# Patient Record
Sex: MALE | Race: WHITE | URBAN - METROPOLITAN AREA
[De-identification: names, ages, dates, MRNs, and addresses within clinical notes are randomized per-mention and may not be internally consistent; named-entity substitution may affect disease eponyms.]

---

## 2017-04-21 ENCOUNTER — OFFICE VISIT (OUTPATIENT)
Dept: SLEEP MEDICINE | Age: 37
End: 2017-04-21

## 2017-04-21 VITALS
HEART RATE: 80 BPM | BODY MASS INDEX: 34.67 KG/M2 | HEIGHT: 72 IN | DIASTOLIC BLOOD PRESSURE: 76 MMHG | WEIGHT: 256 LBS | OXYGEN SATURATION: 98 % | SYSTOLIC BLOOD PRESSURE: 110 MMHG

## 2017-04-21 DIAGNOSIS — G47.33 OBSTRUCTIVE SLEEP APNEA SYNDROME: Primary | Chronic | ICD-10-CM

## 2017-04-21 DIAGNOSIS — E66.9 NON MORBID OBESITY, UNSPECIFIED OBESITY TYPE: Chronic | ICD-10-CM

## 2017-04-21 PROCEDURE — 99212 OFFICE O/P EST SF 10 MIN: CPT | Performed by: NURSE PRACTITIONER

## 2017-04-21 ASSESSMENT — ENCOUNTER SYMPTOMS
ABDOMINAL DISTENTION: 0
APNEA: 0
SINUS PRESSURE: 0
COUGH: 0
SHORTNESS OF BREATH: 0
ABDOMINAL PAIN: 0
RHINORRHEA: 0

## 2017-04-21 ASSESSMENT — SLEEP AND FATIGUE QUESTIONNAIRES
HOW LIKELY ARE YOU TO NOD OFF OR FALL ASLEEP WHILE SITTING AND TALKING TO SOMEONE: 0
HOW LIKELY ARE YOU TO NOD OFF OR FALL ASLEEP WHILE SITTING AND READING: 0
HOW LIKELY ARE YOU TO NOD OFF OR FALL ASLEEP WHILE LYING DOWN TO REST IN THE AFTERNOON WHEN CIRCUMSTANCES PERMIT: 0
HOW LIKELY ARE YOU TO NOD OFF OR FALL ASLEEP IN A CAR, WHILE STOPPED FOR A FEW MINUTES IN TRAFFIC: 0
HOW LIKELY ARE YOU TO NOD OFF OR FALL ASLEEP WHILE SITTING INACTIVE IN A PUBLIC PLACE: 0
ESS TOTAL SCORE: 1
HOW LIKELY ARE YOU TO NOD OFF OR FALL ASLEEP WHEN YOU ARE A PASSENGER IN A CAR FOR AN HOUR WITHOUT A BREAK: 0
HOW LIKELY ARE YOU TO NOD OFF OR FALL ASLEEP WHILE SITTING QUIETLY AFTER LUNCH WITHOUT ALCOHOL: 0
HOW LIKELY ARE YOU TO NOD OFF OR FALL ASLEEP WHILE WATCHING TV: 1

## 2018-03-20 ENCOUNTER — TELEPHONE (OUTPATIENT)
Dept: SLEEP MEDICINE | Age: 38
End: 2018-03-20

## 2018-03-20 NOTE — TELEPHONE ENCOUNTER
Pt called. He has continued to lose weight and stopped using his unit. Explained to pt that weight loss decreases sleep apnea however weight loss does not always mean there is no sleep apnea. Pt has a f/u appointment in April and will use unit until appointment to gather data on sleep. Pt states that he is no longer snoring.

## 2018-04-20 ENCOUNTER — OFFICE VISIT (OUTPATIENT)
Dept: SLEEP MEDICINE | Age: 38
End: 2018-04-20

## 2018-04-20 VITALS
WEIGHT: 194 LBS | OXYGEN SATURATION: 97 % | DIASTOLIC BLOOD PRESSURE: 74 MMHG | SYSTOLIC BLOOD PRESSURE: 128 MMHG | HEART RATE: 74 BPM | BODY MASS INDEX: 26.28 KG/M2 | HEIGHT: 72 IN

## 2018-04-20 DIAGNOSIS — G47.33 OBSTRUCTIVE SLEEP APNEA SYNDROME: Primary | Chronic | ICD-10-CM

## 2018-04-20 PROCEDURE — 99212 OFFICE O/P EST SF 10 MIN: CPT | Performed by: NURSE PRACTITIONER

## 2018-04-20 ASSESSMENT — SLEEP AND FATIGUE QUESTIONNAIRES
HOW LIKELY ARE YOU TO NOD OFF OR FALL ASLEEP IN A CAR, WHILE STOPPED FOR A FEW MINUTES IN TRAFFIC: 0
HOW LIKELY ARE YOU TO NOD OFF OR FALL ASLEEP WHILE SITTING AND TALKING TO SOMEONE: 0
HOW LIKELY ARE YOU TO NOD OFF OR FALL ASLEEP WHILE SITTING INACTIVE IN A PUBLIC PLACE: 0
ESS TOTAL SCORE: 1
HOW LIKELY ARE YOU TO NOD OFF OR FALL ASLEEP WHILE SITTING AND READING: 1
HOW LIKELY ARE YOU TO NOD OFF OR FALL ASLEEP WHILE WATCHING TV: 0
HOW LIKELY ARE YOU TO NOD OFF OR FALL ASLEEP WHILE SITTING QUIETLY AFTER LUNCH WITHOUT ALCOHOL: 0
HOW LIKELY ARE YOU TO NOD OFF OR FALL ASLEEP WHEN YOU ARE A PASSENGER IN A CAR FOR AN HOUR WITHOUT A BREAK: 0
HOW LIKELY ARE YOU TO NOD OFF OR FALL ASLEEP WHILE LYING DOWN TO REST IN THE AFTERNOON WHEN CIRCUMSTANCES PERMIT: 0

## 2018-04-20 ASSESSMENT — ENCOUNTER SYMPTOMS
RHINORRHEA: 0
ABDOMINAL PAIN: 0
ABDOMINAL DISTENTION: 0
COUGH: 0
APNEA: 0
SINUS PRESSURE: 0
SHORTNESS OF BREATH: 0

## 2018-08-30 ENCOUNTER — OFFICE VISIT (OUTPATIENT)
Dept: ORTHOPEDIC SURGERY | Age: 38
End: 2018-08-30

## 2018-08-30 VITALS
WEIGHT: 185 LBS | BODY MASS INDEX: 25.06 KG/M2 | SYSTOLIC BLOOD PRESSURE: 119 MMHG | DIASTOLIC BLOOD PRESSURE: 79 MMHG | HEIGHT: 72 IN | HEART RATE: 80 BPM

## 2018-08-30 DIAGNOSIS — S39.012A LUMBAR STRAIN, INITIAL ENCOUNTER: Primary | ICD-10-CM

## 2018-08-30 PROCEDURE — 99203 OFFICE O/P NEW LOW 30 MIN: CPT | Performed by: PHYSICIAN ASSISTANT

## 2018-08-30 RX ORDER — METHOCARBAMOL 750 MG/1
750 TABLET, FILM COATED ORAL 3 TIMES DAILY
Qty: 45 TABLET | Refills: 0 | Status: SHIPPED | OUTPATIENT
Start: 2018-08-30 | End: 2018-09-14

## 2018-08-30 NOTE — PROGRESS NOTES
Subjective:      Patient ID: Nelda Gibson is a 41 y.o.  male. Chief Complaint   Patient presents with    Back Problem     Patient states about 2 weeks after running with his child he started to have LBP        HPI: He is here in the [address] [place] [address]  for an initial evaluation of Left sided low back pain. This pain started approximately 2 weeks ago after picking up his 1year-old, 30 pound child. Since that time, he said discomfort in the left side of his lower back. He denies any fevers or chills. He denies any radicular pain, numbness or tingling. Denies any bladder or bowel changes. Pain has changed since onset. Mildly improved  Pain is associated with:  Numbness: No.   Tingling: No.   Weakness: No.   Difficulty controlling bowels: No.  Difficulty controlling bladder: No.  Electrical shock sensation in her legs: No.  Difficulty with balance while standing or walking: No.    The following changes pain for the better: Change in position. The following changes pain for the worse: Sitting or standing in one position for extended period of time. Bending forward. The following treatment has been tried:  Physical therapy: No.   Chiropractic treatment: No.  Epidural steroid injection/block: No.   Prescription medications: No.   Over-the-counter medications: Yes. Ibuprofen with possibly some relief. Review of Systems:  Pertinent items are noted in HPI. A 14 point review of systems have been reviewed from Patient History Form dated on 8/30/18 and available in the patient's chart under the media tab. Past Medical History:   Diagnosis Date    Obstructive sleep apnea        Family History   Problem Relation Age of Onset    Heart Failure Father        Past Surgical History:   Procedure Laterality Date    WISDOM TOOTH EXTRACTION         Social History     Occupational History    Not on file.      Social History Main Topics    Smoking status: Former Smoker     Packs/day: 0.50     Years: 9.00     Quit date: 4/20/2009    Smokeless tobacco: Never Used    Alcohol use 0.5 oz/week     1 Standard drinks or equivalent per week      Comment: occassional    Drug use: No    Sexual activity: Not on file       Current Outpatient Prescriptions   Medication Sig Dispense Refill    methocarbamol (ROBAXIN-750) 750 MG tablet Take 1 tablet by mouth 3 times daily for 15 days 45 tablet 0    ibuprofen (ADVIL;MOTRIN) 400 MG tablet Take 400 mg by mouth every 6 hours as needed.  butalbital-acetaminophen-caffeine (FIORICET) per tablet Take 1 tablet by mouth every 4 hours as needed for Pain for 20 doses. 20 tablet 0     No current facility-administered medications for this visit. Objective:     He is alert, oriented x 3, pleasant, well nourished, developed and in no acute distress. /79   Pulse 80   Ht 6' (1.829 m)   Wt 185 lb (83.9 kg)   BMI 25.09 kg/m²        Lumbar Spine Exam:  Deformity: Absent . Soft Tissue Swelling: Absent . Soft Tissue Tenderness: Present. Midline Bone Tenderness:Absent . Paraspinal Muscular Spasm: Present. Previous Incisions: Absent . Erythema Absent . Lumbar Flexion does produce pain. Lumbar Extension does not produce pain.       Range of Motion: Lumbar spine in degrees           Flexion        Extension       Right Bend        Left Bend                                                                     Motor Exam:                                Normal=N   Weak=W   Unable=U   Toe Walk Heel Walk Single Leg Squat   Right            N           N               N   Left            N           N               N     Strength Scale: 1-5   Extensor Hallucis Longus L5 Anterior Tibialis   L4 Quadriceps   L2,3,4    Right             5            5         5   Left             5            5         5     Reflex Exam: 0-4+   Achilles Tendon (gastroc)   S1 Patellar Tendon (quads)   L4   Right                 2+              2+   Left                 2+ 2+     Sensory Exam:    Special Testing: N= Negative  P= Positive   Straight leg raise Reverse straight leg raise Contralateral straight leg raise Log roll Arsh test (kalyan) Babinski Giordano's  test   Right     N     N     N     N     N      N   Left     N     N     N     N     N      N       Gait normal Heel/ Toe. Left hip exam:  There is no deformity. There is no pain with internal and external rotation. There is no pain with flexion and extension. ROM- full range of motion. Trochanteric region is not tender to palpation. There is no pain with weight bearing. Right hip exam:  There is no deformity. There is no pain with internal and external rotation. There is no pain with flexion and extension. ROM- full range of motion. Trochanteric region is not tender to palpation. There is no pain with weight bearing. Vascular exam:  Examination of bilateral lower extremities:   Pallor or Rubor: absent. Digits are warm to touch, capillary refill is less than 2 seconds. There is No edema noted. Skin Exam:  Examination of the skin over both lower extremities reveals: The skin to be intact without lacerations or abrasions. No significant erythema. No rashes or skin lesions. X Rays: performed in the office today:   AP and Lateral Lumbar Spine Radiographs:   Normal findings. The alignment of the lumbar spine is normal. There is no significant degenerative disc and no facet arthropathy. Normal lordotic curve is noted. Additional tests reviewed:  None      Diagnosis:        ICD-10-CM ICD-9-CM    1. Lumbar strain, initial encounter S39.012A 847.2 XR LUMBAR SPINE (2-3 VIEWS)      methocarbamol (ROBAXIN-750) 750 MG tablet      Ambulatory referral to Physical Therapy        Assessment/ Plan:      I believe he has sustained a mild lumbar strain/sprain. I do not believe he has a disc herniation, infection or significant stenosis.       The natural history of the patient's diagnosis as well as the treatment options were discussed in full and questions were answered. Risks and benefits of the treatment options also reviewed in detail. Proper lifting/bending technique discussed  Stretching exercises discussed with patient  OTC NSAID recommended- continue with the ibuprofen 600 mg 3 times daily with GI precautions  Muscle relaxant prescribed- Robaxin 750 mg, 1 q. 68 hours p.r.n. spasm, patient warned about possible sedation  Physical therapy referral provided        Follow Up: 2 weeks with Dr Aster Juan. Call or return to clinic prn if these symptoms worsen or fail to improve as anticipated.

## 2018-08-30 NOTE — LETTER
[facility]  [address] [facility]  [address] [place] [address],[address]  [address]  Phone: [phone]  Fax: [phone]    [place], [place]        September 21, 2018     Jian Bowie MD  [facility]  [facility] [id number]    Patient: Bradley Ayoub  MR Number: [id number]  YOB: 1980  Date of Visit: 8/30/2018    Dear Dr. Jian Bowie:    Thank you for the request for consultation for Bradley Ayoub to me for the evaluation of   Encounter Diagnosis   Name Primary?  Lumbar strain, initial encounter Yes     . Below are the relevant portions of my assessment and plan of care. Subjective:      Patient ID: Bradley Ayoub is a 41 y.o.  male. Chief Complaint   Patient presents with    Back Problem     Patient states about 2 weeks after running with his child he started to have LBP        HPI: He is here in the [address] [place] [address]  for an initial evaluation of Left sided low back pain. This pain started approximately 2 weeks ago after picking up his 1year-old, 30 pound child. Since that time, he said discomfort in the left side of his lower back. He denies any fevers or chills. He denies any radicular pain, numbness or tingling. Denies any bladder or bowel changes. Pain has changed since onset. Mildly improved  Pain is associated with:  Numbness: No.   Tingling: No.   Weakness: No.   Difficulty controlling bowels: No.  Difficulty controlling bladder: No.  Electrical shock sensation in her legs: No.  Difficulty with balance while standing or walking: No.    The following changes pain for the better: Change in position. The following changes pain for the worse: Sitting or standing in one position for extended period of time. Bending forward.     The following treatment has been tried:  Physical therapy: No.   Chiropractic treatment: No.  Epidural steroid injection/block: No.   Prescription medications: No. Flexion        Extension       Right Bend        Left Bend                                                                     Motor Exam:                                Normal=N   Weak=W   Unable=U   Toe Walk Heel Walk Single Leg Squat   Right            N           N               N   Left            N           N               N     Strength Scale: 1-5   Extensor Hallucis Longus L5 Anterior Tibialis   L4 Quadriceps   L2,3,4    Right             5            5         5   Left             5            5         5     Reflex Exam: 0-4+   Achilles Tendon (gastroc)   S1 Patellar Tendon (quads)   L4   Right                 2+              2+   Left                 2+              2+     Sensory Exam:    Special Testing: N= Negative  P= Positive   Straight leg raise Reverse straight leg raise Contralateral straight leg raise Log roll Arsh test (kalyan) Babinski Giordano's  test   Right     N     N     N     N     N      N   Left     N     N     N     N     N      N       Gait normal Heel/ Toe. Left hip exam:  There is no deformity. There is no pain with internal and external rotation. There is no pain with flexion and extension. ROM- full range of motion. Trochanteric region is not tender to palpation. There is no pain with weight bearing. Right hip exam:  There is no deformity. There is no pain with internal and external rotation. There is no pain with flexion and extension. ROM- full range of motion. Trochanteric region is not tender to palpation. There is no pain with weight bearing. Vascular exam:  Examination of bilateral lower extremities:   Pallor or Rubor: absent. Digits are warm to touch, capillary refill is less than 2 seconds. There is No edema noted. Skin Exam:  Examination of the skin over both lower extremities reveals: The skin to be intact without lacerations or abrasions. No significant erythema. No rashes or skin lesions. X Rays: performed in the office today:   AP and Lateral Lumbar Spine Radiographs:   Normal findings. The alignment of the lumbar spine is normal. There is no significant degenerative disc and no facet arthropathy. Normal lordotic curve is noted. Additional tests reviewed:  None      Diagnosis:        ICD-10-CM ICD-9-CM    1. Lumbar strain, initial encounter S39.012A 847.2 XR LUMBAR SPINE (2-3 VIEWS)      methocarbamol (ROBAXIN-750) 750 MG tablet      Ambulatory referral to Physical Therapy        Assessment/ Plan:      I believe he has sustained a mild lumbar strain/sprain. I do not believe he has a disc herniation, infection or significant stenosis. The natural history of the patient's diagnosis as well as the treatment options were discussed in full and questions were answered. Risks and benefits of the treatment options also reviewed in detail. Proper lifting/bending technique discussed  Stretching exercises discussed with patient  OTC NSAID recommended- continue with the ibuprofen 600 mg 3 times daily with GI precautions  Muscle relaxant prescribed- Robaxin 750 mg, 1 q. 68 hours p.r.n. spasm, patient warned about possible sedation  Physical therapy referral provided        Follow Up: 2 weeks with Dr Reza Babb. Call or return to clinic prn if these symptoms worsen or fail to improve as anticipated. If you have questions, please do not hesitate to call me. I look forward to following Sly Nguyễn along with you.     Sincerely,        MARY Lazar

## 2018-09-21 NOTE — COMMUNICATION BODY
Subjective:      Patient ID: Angela Ibarra is a 41 y.o.  male. Chief Complaint   Patient presents with    Back Problem     Patient states about 2 weeks after running with his child he started to have LBP        HPI: He is here in the [address] [place] [address]  for an initial evaluation of Left sided low back pain. This pain started approximately 2 weeks ago after picking up his 1year-old, 30 pound child. Since that time, he said discomfort in the left side of his lower back. He denies any fevers or chills. He denies any radicular pain, numbness or tingling. Denies any bladder or bowel changes. Pain has changed since onset. Mildly improved  Pain is associated with:  Numbness: No.   Tingling: No.   Weakness: No.   Difficulty controlling bowels: No.  Difficulty controlling bladder: No.  Electrical shock sensation in her legs: No.  Difficulty with balance while standing or walking: No.    The following changes pain for the better: Change in position. The following changes pain for the worse: Sitting or standing in one position for extended period of time. Bending forward. The following treatment has been tried:  Physical therapy: No.   Chiropractic treatment: No.  Epidural steroid injection/block: No.   Prescription medications: No.   Over-the-counter medications: Yes. Ibuprofen with possibly some relief. Review of Systems:  Pertinent items are noted in HPI. A 14 point review of systems have been reviewed from Patient History Form dated on 8/30/18 and available in the patient's chart under the media tab. Past Medical History:   Diagnosis Date    Obstructive sleep apnea        Family History   Problem Relation Age of Onset    Heart Failure Father        Past Surgical History:   Procedure Laterality Date    WISDOM TOOTH EXTRACTION         Social History     Occupational History    Not on file.      Social History Main Topics    Smoking status: Former Smoker     Packs/day: 0.50     Years: 9.00     Quit date: 4/20/2009    Smokeless tobacco: Never Used    Alcohol use 0.5 oz/week     1 Standard drinks or equivalent per week      Comment: occassional    Drug use: No    Sexual activity: Not on file       Current Outpatient Prescriptions   Medication Sig Dispense Refill    methocarbamol (ROBAXIN-750) 750 MG tablet Take 1 tablet by mouth 3 times daily for 15 days 45 tablet 0    ibuprofen (ADVIL;MOTRIN) 400 MG tablet Take 400 mg by mouth every 6 hours as needed.  butalbital-acetaminophen-caffeine (FIORICET) per tablet Take 1 tablet by mouth every 4 hours as needed for Pain for 20 doses. 20 tablet 0     No current facility-administered medications for this visit. Objective:     He is alert, oriented x 3, pleasant, well nourished, developed and in no acute distress. /79   Pulse 80   Ht 6' (1.829 m)   Wt 185 lb (83.9 kg)   BMI 25.09 kg/m²        Lumbar Spine Exam:  Deformity: Absent . Soft Tissue Swelling: Absent . Soft Tissue Tenderness: Present. Midline Bone Tenderness:Absent . Paraspinal Muscular Spasm: Present. Previous Incisions: Absent . Erythema Absent . Lumbar Flexion does produce pain. Lumbar Extension does not produce pain.       Range of Motion: Lumbar spine in degrees           Flexion        Extension       Right Bend        Left Bend                                                                     Motor Exam:                                Normal=N   Weak=W   Unable=U   Toe Walk Heel Walk Single Leg Squat   Right            N           N               N   Left            N           N               N     Strength Scale: 1-5   Extensor Hallucis Longus L5 Anterior Tibialis   L4 Quadriceps   L2,3,4    Right             5            5         5   Left             5            5         5     Reflex Exam: 0-4+   Achilles Tendon (gastroc)   S1 Patellar Tendon (quads)   L4   Right                 2+              2+   Left                 2+ 2+     Sensory Exam:    Special Testing: N= Negative  P= Positive   Straight leg raise Reverse straight leg raise Contralateral straight leg raise Log roll Arsh test (kalyan) Babinski Giordano's  test   Right     N     N     N     N     N      N   Left     N     N     N     N     N      N       Gait normal Heel/ Toe. Left hip exam:  There is no deformity. There is no pain with internal and external rotation. There is no pain with flexion and extension. ROM- full range of motion. Trochanteric region is not tender to palpation. There is no pain with weight bearing. Right hip exam:  There is no deformity. There is no pain with internal and external rotation. There is no pain with flexion and extension. ROM- full range of motion. Trochanteric region is not tender to palpation. There is no pain with weight bearing. Vascular exam:  Examination of bilateral lower extremities:   Pallor or Rubor: absent. Digits are warm to touch, capillary refill is less than 2 seconds. There is No edema noted. Skin Exam:  Examination of the skin over both lower extremities reveals: The skin to be intact without lacerations or abrasions. No significant erythema. No rashes or skin lesions. X Rays: performed in the office today:   AP and Lateral Lumbar Spine Radiographs:   Normal findings. The alignment of the lumbar spine is normal. There is no significant degenerative disc and no facet arthropathy. Normal lordotic curve is noted. Additional tests reviewed:  None      Diagnosis:        ICD-10-CM ICD-9-CM    1. Lumbar strain, initial encounter S39.012A 847.2 XR LUMBAR SPINE (2-3 VIEWS)      methocarbamol (ROBAXIN-750) 750 MG tablet      Ambulatory referral to Physical Therapy        Assessment/ Plan:      I believe he has sustained a mild lumbar strain/sprain. I do not believe he has a disc herniation, infection or significant stenosis.       The natural history of the patient's diagnosis as well as the treatment options were discussed in full and questions were answered. Risks and benefits of the treatment options also reviewed in detail. Proper lifting/bending technique discussed  Stretching exercises discussed with patient  OTC NSAID recommended- continue with the ibuprofen 600 mg 3 times daily with GI precautions  Muscle relaxant prescribed- Robaxin 750 mg, 1 q. 68 hours p.r.n. spasm, patient warned about possible sedation  Physical therapy referral provided        Follow Up: 2 weeks with Dr Charlie Reynolds. Call or return to clinic prn if these symptoms worsen or fail to improve as anticipated.

## 2019-05-03 ENCOUNTER — EMPLOYEE WELLNESS (OUTPATIENT)
Dept: OTHER | Age: 39
End: 2019-05-03

## 2019-05-03 LAB
CHOLESTEROL, TOTAL: 166 MG/DL (ref 0–199)
GLUCOSE BLD-MCNC: 74 MG/DL (ref 70–99)
HDLC SERPL-MCNC: 35 MG/DL (ref 40–60)
LDL CHOLESTEROL CALCULATED: 107 MG/DL
TRIGL SERPL-MCNC: 119 MG/DL (ref 0–150)

## 2019-05-13 VITALS — WEIGHT: 202 LBS | BODY MASS INDEX: 27.4 KG/M2

## 2019-07-08 ENCOUNTER — OFFICE VISIT (OUTPATIENT)
Dept: INTERNAL MEDICINE CLINIC | Age: 39
End: 2019-07-08
Payer: COMMERCIAL

## 2019-07-08 VITALS
HEART RATE: 63 BPM | WEIGHT: 199 LBS | BODY MASS INDEX: 27.86 KG/M2 | SYSTOLIC BLOOD PRESSURE: 110 MMHG | DIASTOLIC BLOOD PRESSURE: 64 MMHG | OXYGEN SATURATION: 98 % | HEIGHT: 71 IN

## 2019-07-08 DIAGNOSIS — G47.33 OBSTRUCTIVE SLEEP APNEA SYNDROME: Primary | ICD-10-CM

## 2019-07-08 DIAGNOSIS — Z86.39 HISTORY OF OBESITY: ICD-10-CM

## 2019-07-08 PROCEDURE — 99203 OFFICE O/P NEW LOW 30 MIN: CPT | Performed by: INTERNAL MEDICINE

## 2019-07-08 SDOH — HEALTH STABILITY: MENTAL HEALTH: HOW OFTEN DO YOU HAVE A DRINK CONTAINING ALCOHOL?: 2-4 TIMES A MONTH

## 2019-07-08 SDOH — HEALTH STABILITY: MENTAL HEALTH: HOW MANY STANDARD DRINKS CONTAINING ALCOHOL DO YOU HAVE ON A TYPICAL DAY?: 1 OR 2

## 2019-07-08 ASSESSMENT — PATIENT HEALTH QUESTIONNAIRE - PHQ9
2. FEELING DOWN, DEPRESSED OR HOPELESS: 0
SUM OF ALL RESPONSES TO PHQ9 QUESTIONS 1 & 2: 0
SUM OF ALL RESPONSES TO PHQ QUESTIONS 1-9: 0
SUM OF ALL RESPONSES TO PHQ QUESTIONS 1-9: 0
1. LITTLE INTEREST OR PLEASURE IN DOING THINGS: 0

## 2019-07-08 NOTE — PROGRESS NOTES
[address]  [facility] [address]: [phone]    Patient Name: Milagro Adams     YOB: 1980     Today's Date: 7/8/19          Chief Complaint   Patient presents with   [facility] Established New Doctor    Referral - General     sleep medicine          History of Present Illness:  Leroy James is a 42 y.o. male here to establish care. Patient here for evaluation for sleep apnea  Was diagnosed 4 to 5 years ago. At that time he weighed 270 pounds. He lost weight intentionally. He stopped consuming sugars and started exercising. He tried intermittent fasting. He is a runner. Currently training for his first marathon. He admits that he would still like to lose 10 more pounds. That would allow him to get to his minimum adult weight that he has obtained in the past.  He also thinks it may help his marathon. He admits he is feels sometimes struggles with body image. History:     Past Medical History:   Diagnosis Date    Obstructive sleep apnea 2015       Past Surgical History:   Procedure Laterality Date    WISDOM TOOTH EXTRACTION         Social History     Socioeconomic History    Marital status:      Spouse name: Not on file    Number of children: Not on file    Years of education: Not on file    Highest education level: Not on file   Occupational History    Occupation:     Social Needs    Financial resource strain: Not on file    Food insecurity:     Worry: Not on file     Inability: Not on file   Tradiio needs:     Medical: Not on file     Non-medical: Not on file   Tobacco Use    Smoking status: Former Smoker     Packs/day: 0.50     Years: 9.00     Pack years: 4.50     Last attempt to quit: 4/20/2009     Years since quitting: 10.2    Smokeless tobacco: Never Used   Substance and Sexual Activity    Alcohol use:  Yes     Alcohol/week: 2.0 standard drinks     Types: 2 Standard drinks or equivalent per

## 2019-07-17 ASSESSMENT — ENCOUNTER SYMPTOMS
RHINORRHEA: 0
SORE THROAT: 0
BACK PAIN: 0
CONSTIPATION: 0
VOMITING: 0
COUGH: 0
SINUS PRESSURE: 0
NAUSEA: 0
EYE REDNESS: 0
CHEST TIGHTNESS: 0
ABDOMINAL PAIN: 0
WHEEZING: 0
SHORTNESS OF BREATH: 0
DIARRHEA: 0

## 2019-10-02 ENCOUNTER — OFFICE VISIT (OUTPATIENT)
Dept: PULMONOLOGY | Age: 39
End: 2019-10-02
Payer: COMMERCIAL

## 2019-10-02 VITALS
BODY MASS INDEX: 27.16 KG/M2 | DIASTOLIC BLOOD PRESSURE: 70 MMHG | SYSTOLIC BLOOD PRESSURE: 110 MMHG | HEIGHT: 71 IN | HEART RATE: 90 BPM | WEIGHT: 194 LBS | OXYGEN SATURATION: 99 %

## 2019-10-02 DIAGNOSIS — G47.33 OBSTRUCTIVE SLEEP APNEA SYNDROME: Primary | ICD-10-CM

## 2019-10-02 PROCEDURE — 99212 OFFICE O/P EST SF 10 MIN: CPT | Performed by: NURSE PRACTITIONER

## 2019-10-02 ASSESSMENT — SLEEP AND FATIGUE QUESTIONNAIRES
HOW LIKELY ARE YOU TO NOD OFF OR FALL ASLEEP WHILE SITTING INACTIVE IN A PUBLIC PLACE: 0
HOW LIKELY ARE YOU TO NOD OFF OR FALL ASLEEP WHILE SITTING QUIETLY AFTER LUNCH WITHOUT ALCOHOL: 0
HOW LIKELY ARE YOU TO NOD OFF OR FALL ASLEEP WHEN YOU ARE A PASSENGER IN A CAR FOR AN HOUR WITHOUT A BREAK: 0
HOW LIKELY ARE YOU TO NOD OFF OR FALL ASLEEP WHILE SITTING AND TALKING TO SOMEONE: 0
HOW LIKELY ARE YOU TO NOD OFF OR FALL ASLEEP WHILE SITTING AND READING: 0
ESS TOTAL SCORE: 0
HOW LIKELY ARE YOU TO NOD OFF OR FALL ASLEEP WHILE LYING DOWN TO REST IN THE AFTERNOON WHEN CIRCUMSTANCES PERMIT: 0
HOW LIKELY ARE YOU TO NOD OFF OR FALL ASLEEP IN A CAR, WHILE STOPPED FOR A FEW MINUTES IN TRAFFIC: 0
HOW LIKELY ARE YOU TO NOD OFF OR FALL ASLEEP WHILE WATCHING TV: 0

## 2019-10-02 ASSESSMENT — ENCOUNTER SYMPTOMS
ABDOMINAL PAIN: 0
EYE PAIN: 0
SHORTNESS OF BREATH: 0
APNEA: 0
SINUS PRESSURE: 0
EYE REDNESS: 0
COUGH: 0
RHINORRHEA: 0
ABDOMINAL DISTENTION: 0

## 2019-10-23 ENCOUNTER — HOSPITAL ENCOUNTER (OUTPATIENT)
Age: 39
Discharge: HOME | End: 2019-10-23
Payer: COMMERCIAL

## 2019-10-23 ENCOUNTER — HOSPITAL ENCOUNTER (OUTPATIENT)
Dept: GENERAL RADIOLOGY | Age: 39
Discharge: HOME | End: 2019-10-23
Payer: COMMERCIAL

## 2019-10-23 ENCOUNTER — OFFICE VISIT (OUTPATIENT)
Dept: INTERNAL MEDICINE CLINIC | Age: 39
End: 2019-10-23
Payer: COMMERCIAL

## 2019-10-23 VITALS
HEART RATE: 88 BPM | BODY MASS INDEX: 28.17 KG/M2 | WEIGHT: 202 LBS | OXYGEN SATURATION: 97 % | DIASTOLIC BLOOD PRESSURE: 74 MMHG | SYSTOLIC BLOOD PRESSURE: 132 MMHG

## 2019-10-23 DIAGNOSIS — R05.9 COUGH IN ADULT: Primary | ICD-10-CM

## 2019-10-23 DIAGNOSIS — R05.9 COUGH IN ADULT: ICD-10-CM

## 2019-10-23 PROCEDURE — 71046 X-RAY EXAM CHEST 2 VIEWS: CPT

## 2019-10-23 PROCEDURE — 99213 OFFICE O/P EST LOW 20 MIN: CPT | Performed by: NURSE PRACTITIONER

## 2019-10-23 RX ORDER — AZITHROMYCIN 250 MG/1
250 TABLET, FILM COATED ORAL SEE ADMIN INSTRUCTIONS
Qty: 6 TABLET | Refills: 0 | Status: SHIPPED | OUTPATIENT
Start: 2019-10-23 | End: 2019-10-28

## 2019-10-23 ASSESSMENT — ENCOUNTER SYMPTOMS
EYES NEGATIVE: 1
COUGH: 1
SHORTNESS OF BREATH: 1

## 2020-03-05 ENCOUNTER — EMPLOYEE WELLNESS (OUTPATIENT)
Dept: OTHER | Age: 40
End: 2020-03-05

## 2020-03-05 LAB
CHOLESTEROL, TOTAL: 158 MG/DL (ref 0–199)
GLUCOSE BLD-MCNC: 95 MG/DL (ref 70–99)
HDLC SERPL-MCNC: 38 MG/DL (ref 40–60)
LDL CHOLESTEROL CALCULATED: 106 MG/DL
TRIGL SERPL-MCNC: 69 MG/DL (ref 0–150)

## 2020-03-08 LAB
3-OH-COTININE: <2 NG/ML
COTININE: <2 NG/ML
NICOTINE: <2 NG/ML

## 2020-10-19 VITALS — WEIGHT: 201 LBS | BODY MASS INDEX: 28.03 KG/M2

## 2021-04-19 ENCOUNTER — OFFICE VISIT (OUTPATIENT)
Dept: ORTHOPEDIC SURGERY | Age: 41
End: 2021-04-19
Payer: COMMERCIAL

## 2021-04-19 VITALS — WEIGHT: 215 LBS | HEIGHT: 72 IN | BODY MASS INDEX: 29.12 KG/M2

## 2021-04-19 DIAGNOSIS — M54.50 LEFT LOW BACK PAIN, UNSPECIFIED CHRONICITY, UNSPECIFIED WHETHER SCIATICA PRESENT: Primary | ICD-10-CM

## 2021-04-19 PROCEDURE — 99214 OFFICE O/P EST MOD 30 MIN: CPT | Performed by: PHYSICIAN ASSISTANT

## 2021-04-19 RX ORDER — METHOCARBAMOL 500 MG/1
500 TABLET, FILM COATED ORAL 4 TIMES DAILY
Qty: 40 TABLET | Refills: 0 | Status: SHIPPED | OUTPATIENT
Start: 2021-04-19 | End: 2021-04-29

## 2021-04-19 RX ORDER — METHYLPREDNISOLONE 4 MG/1
TABLET ORAL
Qty: 1 KIT | Refills: 0 | Status: SHIPPED | OUTPATIENT
Start: 2021-04-19 | End: 2021-07-02

## 2021-04-19 NOTE — PROGRESS NOTES
Date:  2021    Name:  Filemon Huerta  Address:  [address] [employer]    :  1980      Age:   46 y.o.    SSN:  [id number]      Medical Record Number:  [id number]    Reason for Visit:    Chief Complaint    Back Pain (LBP and Left leg pain x1 mo. No known injury.)      DOS:2021     HPI: Filemon Huerta is a 46 y.o. male here today for left low back pain and left gluteal pain that has been ongoing for a few weeks with no associated injury. Patient states he has a  and it may have been associated with picking up the . In addition he is transitioning from standing just to seated which may have contributed to this. Patient describes severe discomfort in his left lower back. Patient states that the transition from seated to standing and vice versa is extremely painful and it takes him a good amount of time to be comfortable in either. Patient is having difficulty sleeping especially if he moves throughout the night. Denies any numbness or tingling. Denies any incontinence or saddle anesthesia. Pain Assessment  Location of Pain: Back  Location Modifiers: Left  Severity of Pain: 5  Quality of Pain: Other (Comment)(shooting)  Duration of Pain: Persistent  Frequency of Pain: Constant  Date Pain First Started: (x1 mo)  Aggravating Factors: Other (Comment), Walking, Stairs(Twisting)  Limiting Behavior: Yes  Relieving Factors: Other (Comment)(Certain positions)  Result of Injury: No  Work-Related Injury: No  Are there other pain locations you wish to document?: No  ROS: Review of systems reviewed from Patient History Form completed today and available in the patient's chart under the Media tab.        Past Medical History:   Diagnosis Date    Obstructive sleep apnea         Past Surgical History:   Procedure Laterality Date    WISDOM TOOTH EXTRACTION         Family History   Problem Relation Age of Onset    COPD Father     Heart Attack Father 37    ADHD Father     No Known Problems Mother     No Known Problems Maternal Grandmother     No Known Problems Maternal Grandfather     No Known Problems Paternal Grandmother     Cancer Paternal Grandfather     Seizures Sister     No Known Problems Brother     No Known Problems Brother        Social History     Socioeconomic History    Marital status:      Spouse name: None    Number of children: None    Years of education: None    Highest education level: None   Occupational History    Occupation:     Social Needs    Financial resource strain: None    Food insecurity     Worry: None     Inability: None    Transportation needs     Medical: None     Non-medical: None   Tobacco Use    Smoking status: Former Smoker     Packs/day: 0.50     Years: 9.00     Pack years: 4.50     Quit date: 2009     Years since quittin.0    Smokeless tobacco: Never Used   Substance and Sexual Activity    Alcohol use:  Yes     Alcohol/week: 2.0 standard drinks     Types: 2 Standard drinks or equivalent per week     Frequency: 2-4 times a month     Drinks per session: 1 or 2     Binge frequency: Never     Comment: occassional    Drug use: No    Sexual activity: Yes     Partners: Female   Lifestyle    Physical activity     Days per week: None     Minutes per session: None    Stress: None   Relationships    Social connections     Talks on phone: None     Gets together: None     Attends Anabaptism service: None     Active member of club or organization: None     Attends meetings of clubs or organizations: None     Relationship status: None    Intimate partner violence     Fear of current or ex partner: None     Emotionally abused: None     Physically abused: None     Forced sexual activity: None   Other Topics Concern    None   Social History Narrative    Lives with wife and two sons- 2019        Current Outpatient Medications   Medication Sig Dispense Refill    methylPREDNISolone (MEDROL, AGUSTÍN,) 4 MG tablet Take by mouth. 1 kit 0    methocarbamol (ROBAXIN) 500 MG tablet Take 1 tablet by mouth 4 times daily for 10 days 40 tablet 0    ibuprofen (ADVIL;MOTRIN) 400 MG tablet Take 400 mg by mouth every 6 hours as needed.  butalbital-acetaminophen-caffeine (FIORICET) per tablet Take 1 tablet by mouth every 4 hours as needed for Pain for 20 doses. (Patient not taking: Reported on 4/19/2021) 20 tablet 0     No current facility-administered medications for this visit. No Known Allergies    Vital signs:  Ht 6' (1.829 m)   Wt 215 lb (97.5 kg)   BMI 29.16 kg/m²      Lumbral/sacral examination:    Gait & station: normal, patient ambulates without assistance    Inspection: Local inspection shows no step-off or bruising. Lumbar alignment is normal.    Skin: There are no rashes, ulcerations or lesions. Palpation: Mild paraspinous tenderness present bilaterally in lower lumbar region. There is paraspinal spasm. Tenderness over left gluteal muscles. Range of Motion: Limited due to pain. Both in extension and flexion. Strength: Strength testing is 5/5 in all muscle groups tested. Reflexes: Reflexes are symmetrically 2+ at the patellar and ankle tendons. Clonus absent bilaterally at the feet. Special Tests: Straight leg raise and crossed SLR negative. Leg length and pelvis level. Additional Examinations: Negative Trendelenburg test.        Diagnostics:  Radiology:       Pertinent imaging was obtained, interpreted, and reviewed with the patient today, report only - no images available    Lumbar x-ray:    AP and Lateral views were obtained and reviewed of the lumbar spine. Impression: Normal alignment, no evidence of degenerative disc or facet arthropathy.   Lordotic curve is slightly decreased    Office Procedures:  Orders Placed This Encounter   Procedures    XR LUMBAR SPINE (2-3 VIEWS)     Standing Status:   Future     Number of Occurrences:   1     Standing Expiration Date:   5/19/2021

## 2021-04-26 ENCOUNTER — OFFICE VISIT (OUTPATIENT)
Dept: ORTHOPEDIC SURGERY | Age: 41
End: 2021-04-26
Payer: COMMERCIAL

## 2021-04-26 VITALS
HEART RATE: 85 BPM | BODY MASS INDEX: 28.71 KG/M2 | DIASTOLIC BLOOD PRESSURE: 77 MMHG | HEIGHT: 72 IN | SYSTOLIC BLOOD PRESSURE: 127 MMHG | WEIGHT: 212 LBS

## 2021-04-26 DIAGNOSIS — M51.26 HNP (HERNIATED NUCLEUS PULPOSUS), LUMBAR: Primary | ICD-10-CM

## 2021-04-26 PROCEDURE — 99214 OFFICE O/P EST MOD 30 MIN: CPT | Performed by: PHYSICIAN ASSISTANT

## 2021-04-26 RX ORDER — TIZANIDINE 2 MG/1
2 TABLET ORAL 4 TIMES DAILY PRN
Qty: 60 TABLET | Refills: 0 | Status: SHIPPED | OUTPATIENT
Start: 2021-04-26 | End: 2021-05-26

## 2021-04-26 RX ORDER — GABAPENTIN 100 MG/1
100 CAPSULE ORAL 3 TIMES DAILY
Qty: 90 CAPSULE | Refills: 0 | Status: SHIPPED | OUTPATIENT
Start: 2021-04-26 | End: 2021-07-02

## 2021-04-26 NOTE — PROGRESS NOTES
Subjective:      Patient ID: Odin Winchester is a 46 y.o.  male. Chief Complaint   Patient presents with    Lower Back Pain     Pain for 6 weeks No injury        HPI:   He is here for an initial evaluation of left-sided low back pain and left posterior buttock/thigh pain. The symptoms began approximately 6 weeks ago. No history of injury. Pain has changed since onset. Symptoms are more severe. Pain is associated with:  Numbness: No.  Tingling: No.   Perceived weakness: No.   Difficulty controlling bowels: No.  Difficulty controlling bladder: No.  Electrical shock sensation in her legs: No.  Difficulty with balance while standing or walking: No.    The following changes pain for the better: Sitting, standing, rising from seated position, leaning forward and walking. The following changes pain for the worse: Prolonged sitting, prolonged standing or walking. The following treatment has been tried:  Physical therapy: No.  Chiropractic treatment: No.  Epidural steroid injection/block: No.   Prescription medications: Medrol Dosepak prescribed 4/19/2021 as well as Robaxin 500mg when he was evaluated in the after-hours clinic at [facility]. .   Over-the-counter medications: Ibuprofen. His medications are not helping. .       Review of Systems:  Pertinent items are noted in HPI. A 14 point review of systems have been reviewed from Patient History Form on 4/19/2021 as well as the Spine Form dated on today's date and available in the patient's chart under the media tab.     Past Medical History:   Diagnosis Date    Obstructive sleep apnea 2015       Family History   Problem Relation Age of Onset    COPD Father     Heart Attack Father 37    ADHD Father     No Known Problems Mother     No Known Problems Maternal Grandmother     No Known Problems Maternal Grandfather     No Known Problems Paternal Grandmother     Cancer Paternal Grandfather     Seizures Sister     No Known Problems Brother     No Known Problems Brother        Past Surgical History:   Procedure Laterality Date    WISDOM TOOTH EXTRACTION         Social History     Occupational History    Occupation:     Tobacco Use    Smoking status: Former Smoker     Packs/day: 0.50     Years: 9.00     Pack years: 4.50     Quit date: 2009     Years since quittin.0    Smokeless tobacco: Never Used   Substance and Sexual Activity    Alcohol use: Yes     Alcohol/week: 2.0 standard drinks     Types: 2 Standard drinks or equivalent per week     Frequency: 2-4 times a month     Drinks per session: 1 or 2     Binge frequency: Never     Comment: occassional    Drug use: No    Sexual activity: Yes     Partners: Female       Current Outpatient Medications   Medication Sig Dispense Refill    tiZANidine (ZANAFLEX) 2 MG tablet Take 1 tablet by mouth 4 times daily as needed (spasm) 60 tablet 0    gabapentin (NEURONTIN) 100 MG capsule Take 1 capsule by mouth 3 times daily for 30 days. Intended supply: 30 days 90 capsule 0    methylPREDNISolone (MEDROL, AGUSTÍN,) 4 MG tablet Take by mouth. 1 kit 0    methocarbamol (ROBAXIN) 500 MG tablet Take 1 tablet by mouth 4 times daily for 10 days 40 tablet 0    ibuprofen (ADVIL;MOTRIN) 400 MG tablet Take 400 mg by mouth every 6 hours as needed.  butalbital-acetaminophen-caffeine (FIORICET) per tablet Take 1 tablet by mouth every 4 hours as needed for Pain for 20 doses. 20 tablet 0     No current facility-administered medications for this visit. Objective:     He is alert, oriented x 3, pleasant, well nourished, developed and in no acute distress. /77   Pulse 85   Ht 6' (1.829 m)   Wt 212 lb (96.2 kg)   BMI 28.75 kg/m²        Lumbar Spine Exam:  Deformity: Absent . Soft Tissue Swelling: Absent . Soft Tissue Tenderness: Absent . Midline Bone Tenderness:Absent . Paraspinal Muscular Spasm: Present . Previous Incisions: Absent . Erythema Absent .   Lumbar Flexion does not produce pain.  Lumbar Extension does not produce pain. Motor Exam:                                Normal=N   Weak=W   Unable=U   Toe Walk Heel Walk Single Leg Squat   Right            N           N               N   Left            N           N               N     Strength Scale: 1-5   Extensor Hallucis Longus L5 Anterior Tibialis   L4 Quadriceps   L2,3,4    Right             5            5         5   Left             5            5         5     Reflex Exam: 0-4+   Achilles Tendon (gastroc)   S1 Patellar Tendon (quads)   L4   Right                 2+              2+   Left                 2+              2+     Sensory Exam: Intact to light touch in the left and right lower extremity. Special Testing: N= Negative  P= Positive   Straight leg raise Reverse straight leg raise Contralateral straight leg raise Log roll Arsh test (CHRIS) Babinski Giordano's  test   Right     N     N     N     N     N      N   Left     P     N     P     N     N      N       Gait normal Heel/ Toe. Left hip exam:  There is no deformity. There is no pain with internal and external rotation. There is no pain with flexion and extension. ROM- full range of motion. Trochanteric region is not tender to palpation. There is no pain with weight bearing. Right hip exam:  There is no deformity. There is no pain with internal and external rotation. There is no pain with flexion and extension. ROM- full range of motion. Trochanteric region is not tender to palpation. There is no pain with weight bearing. Vascular exam:  Examination of bilateral lower extremities:   Pallor or Rubor: absent. Digits are warm to touch, capillary refill is less than 2 seconds. There is No edema noted. Skin exam:  Examination of the skin over both lower extremities reveals: The skin to be intact without lacerations or abrasions. No significant erythema. No rashes or skin lesions.      X Rays: was not performed in the office today:

## 2021-04-28 ENCOUNTER — HOSPITAL ENCOUNTER (OUTPATIENT)
Dept: PHYSICAL THERAPY | Age: 41
Setting detail: THERAPIES SERIES
Discharge: HOME | End: 2021-04-28
Payer: COMMERCIAL

## 2021-04-28 PROCEDURE — 97110 THERAPEUTIC EXERCISES: CPT

## 2021-04-28 PROCEDURE — 97161 PT EVAL LOW COMPLEX 20 MIN: CPT

## 2021-04-28 PROCEDURE — 97530 THERAPEUTIC ACTIVITIES: CPT

## 2021-04-28 NOTE — PLAN OF CARE
[address] [address], [address] [place] [address] Drive  Phone: [phone]   Fax: [phone]     Physical Therapy Certification    Dear Referring Practitioner: MARY Butt,    We had the pleasure of evaluating the following patient for physical therapy services at [address] [place] [address]. A summary of our findings can be found in the initial assessment below. This includes our plan of care. If you have any questions or concerns regarding these findings, please do not hesitate to contact me at the office phone number checked above. Thank you for the referral.       Physician Signature:_______________________________Date:__________________  By signing above (or electronic signature), therapists plan is approved by physician      Patient: Ellen Vivar   : 1980   MRN: [id number]  Referring Physician: Referring Practitioner: MARY Butt      Evaluation Date: 2021      Medical Diagnosis Information:  Diagnosis: herniated lumbar disc                                             Insurance information: PT Insurance Information: [employer], 60/40 plan, 60 visits a year     Precautions/ Contra-indications:   Latex Allergy:  [x]NO      []YES  Preferred Language for Healthcare:   [x]English       []Other:    C-SSRS Triggered by Intake questionnaire (Past 2 wk assessment ):   [x] No, Questionnaire did not trigger screening.   [] Yes, Patient intake triggered C-SSRS Screening     [] Completed, no further action required. [] Completed, PCP notified via Epic    SUBJECTIVE: Patient stated complaint: insiduous onset of back pain that more recently progressed to being more radicular in nature. Says that he had increased his running and lifting regime and that seemed to be a potential irritation to his back pain, but he's not able to point to a particular activity that caused it.   Patient points to the lower lumbar region as area of stiffness and reports actual pain in the left buttock and some also in the right buttock to a lesser degree; no change with bowel or bladder; denies numbness or tingling; Says that the pain in his right buttock is causing him to limp. He has stopped all jarring exercise at this point including running, push ups and pull ups. Has seen orthopedic who gave him a steroid taper (he's completed that), mm relaxant, NSAIDs, and gabopentin. This all seems to be helping.     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[employer], software development, working from home currently; has a desk that can convert to a standing desk, so he does use that at times    Living Status/Prior Level of Function: Prior to this injury / incident, pt was independent with ADLs and IADLs, father of 3 young children including an infant; coaching youth baseball      OBJECTIVE:   Palpation: no significant point tenderness    Functional Mobility/Transfers: independent, but cautious due to pain    Posture: flat back posture generally        Gait: (include devices/WB status) mild antalgia, but no AD    Bandages/Dressings/Incisions: NA    Dermatomes Normal Abnormal Comments   inguinal area (L1)  x     anterior mid-thigh (L2) x     distal ant thigh/med knee (L3) x     medial lower leg and foot (L4) x     lateral lower leg and foot (L5)      posterior calf (S1) x     medial calcaneus (S2) x         Reflexes Normal Abnormal Comments   S1-2 Seated achilles x Mildly sluggish on the left   S1-2 Prone knee bend x     L3-4 Patellar tendon x     Clonus x     Babinski NT         ROM  Comments   Lumbar Flex Able to lean forward to touch the toes about 50% with pain    Lumbar Ext Limited 50% with complaints of stiffness      ROM LEFT RIGHT Comments   Lumbar Side Bend      Lumbar Rotation      Hip Flexion 100 115    Hip Abd [facility] WFL    [facility]    Hip IR      Hip Extension To 5 deg B     Knee Ext [facility] WFL    Knee Flex [facility]    Hamstring Flex 44 deg 40 deg In 90/90   Piriformis [facility]                    Joint mobility:    [x]Normal    []Hypo   []Hyper      Strength / Myotomes LEFT RIGHT Comments   Multifidus      Transverse Ab      Hip Flexors (L1-2) 4 with pain 5    Hip Abductors 4 with pain 5    Hip Extensors 4 5    Hip Internal Rotators 4 5    Hip External Rotators 4 5    Quads (L2-4) 5 5    Hamstrings  5 5    Ankle Plantarflexion (S1-2) 5 5    Ankle Dorsiflexion (L4-5) 5 5    Ankle Inversion      Ankle Eversion (S1-2)      Great Toe Extension (L5)                 Neural dynamic tension testing Normal Abnormal Comments   Slump Test  - Degree of knee flexion:   x    SLR       0-30 x     30-70  x    Femoral nerve (L2-4) x         Orthopedic Special Tests:   Normal Abnormal N/A Comments   Toe walk   x      Heel Walk x      Fwd Bend-aberrant or innominate mvmt)  x     Trendelenburg x      Kemps/Quadrant x      Stork   x    CHRIS/Arsh x      Hip scour x      SLR  x     Crossed SLR  x     Supine to sit  x     Hip thrust   x    SI distraction/compression x      PA/Spring x      Prone Instability test   x    Prone knee bend x      Sacral Spring/thrust x                 [x] Patient history, allergies, meds reviewed. Medical chart reviewed. See intake form. Review Of Systems (ROS):  [x]Performed Review of systems (Integumentary, CardioPulmonary, Neurological) by intake and observation. Intake form has been scanned into medical record.  Patient has been instructed to contact their primary care physician regarding ROS issues if not already being addressed at this time.       Co-morbidities/Complexities (which will affect course of rehabilitation):   []None        []Hx of COVID   Arthritic conditions   []Rheumatoid arthritis (M05.9)  []Osteoarthritis (M19.91)  []Gout   Cardiovascular conditions   []Hypertension (I10)  []Hyperlipidemia (E78.5)  []Angina pectoris (I20)  []Atherosclerosis (I70)  []Pacemaker  []Hx of CABG/stent/  cardiac surgeries   Musculoskeletal conditions   []Disc pathology   []Congenital spine pathologies   []Osteoporosis (M81.8)  []Osteopenia (M85.8)  []Scoliosis       Endocrine conditions   []Hypothyroid (E03.9)  []Hyperthyroid Gastrointestinal conditions   []Constipation (T77.95)   Metabolic conditions   []Morbid obesity (E66.01)  []Diabetes type 1(E10.65) or 2 (E11.65)   []Neuropathy (G60.9)     Cardio/Pulmonary conditions   []Asthma (J45)  []Coughing   []COPD (J44.9)  []CHF  []A-fib   Psychological Disorders  []Anxiety (F41.9)  []Depression (F32.9)   []Other:   Developmental Disorders  []Autism (F84.0)  []CP (G80)  []Down Syndrome (Q90.9)  []Developmental delay     Neurological conditions  []Prior Stroke (I69.30)  []Parkinson's (G20)  []Encephalopathy (G93.40)  []MS (G35)  []Post-polio (G14)  []SCI  []TBI  []ALS Other conditions  []Fibromyalgia (M79.7)  []Vertigo  []Syncope  []Kidney Failure  []Cancer      []currently undergoing                treatment  []Pregnancy  []Incontinence   Prior surgeries  []involved limb  []previous spinal surgery  [] section birth  []hysterectomy  []bowel / bladder surgery  []other relevant surgeries   [x]Other:  Sleep apnea            Barriers to/and or personal factors that will affect rehab potential:              []Age  []Sex    []Smoker              []Motivation/Lack of Motivation                        []Co-Morbidities              []Cognitive Function, education/learning barriers              []Environmental, home barriers              []profession/work barriers  []past PT/medical experience  []other:  Justification:     Falls Risk Assessment (30 days):   [x] Falls Risk assessed and no intervention required. [] Falls Risk assessed and Patient requires intervention due to being higher risk   TUG score (>12s at risk):     [] Falls education provided, including         ASSESSMENT:   Functional Impairments:     [x]Noted lumbar/proximal hip hypomobility   []Noted lumbosacral and/or generalized hypermobility   []Decreased Lumbosacral/hip/LE functional ROM   [x]Decreased core/proximal hip strength and neuromuscular control    [x]Decreased LE functional strength    []Abnormal reflexes/sensation/myotomal/dermatomal deficits  []Reduced balance/proprioceptive control    []other:      Functional Activity Limitations (from functional questionnaire and intake)   [x]Reduced ability to tolerate prolonged functional positions   [x]Reduced ability or difficulty with changes of positions or transfers between positions   [x]Reduced ability to maintain good posture and demonstrate good body mechanics with sitting, bending, and lifting   []Reduced ability to sleep   [x] Reduced ability or tolerance with driving and/or computer work   [x]Reduced ability to perform lifting, reaching, carrying tasks   [x]Reduced ability to squat   [x]Reduced ability to forward bend   [x]Reduced ability to ambulate prolonged functional periods/distances/surfaces   []Reduced ability to ascend/descend stairs   []other:       Participation Restrictions   [x]Reduced participation in self care activities   [x]Reduced participation in home management activities   [x]Reduced participation in work activities   [x]Reduced participation in social activities. [x]Reduced participation in sport/recreational activities. Classification:   []Signs/symptoms consistent with Lumbar instability/stabilization subgroup.       [x]Signs/symptoms consistent with Lumbar mobilization/manipulation subgroup, myotomes and dermatomes intact. Meets manipulation criteria. []Signs/symptoms consistent with Lumbar direction specific/centralization subgroup   [x]Signs/symptoms consistent with Lumbar traction subgroup     []Signs/symptoms consistent with lumbar facet dysfunction   []Signs/symptoms consistent with lumbar stenosis type dysfunction   [x]Signs/symptoms consistent with nerve root involvement including myotome & dermatome dysfunction   []Signs/symptoms consistent with post-surgical status including: decreased ROM, strength and function. []signs/symptoms consistent with pathology which may benefit from Dry needling     []other:      Prognosis/Rehab Potential:      [x]Excellent   []Good    []Fair   []Poor    Tolerance of evaluation/treatment:    [x]Excellent   []Good    []Fair   []Poor     Physical Therapy Evaluation Complexity Justification  [x] A history of present problem with:  [x] no personal factors and/or comorbidities that impact the plan of care;  []1-2 personal factors and/or comorbidities that impact the plan of care  []3 personal factors and/or comorbidities that impact the plan of care  [x] An examination of body systems using standardized tests and measures addressing any of the following: body structures and functions (impairments), activity limitations, and/or participation restrictions;:  [x] a total of 1-2 or more elements   [] a total of 3 or more elements   [] a total of 4 or more elements   [x] A clinical presentation with:  [x] stable and/or uncomplicated characteristics   [] evolving clinical presentation with changing characteristics  [] unstable and unpredictable characteristics;   [x] Clinical decision making of [x] low, [] moderate, [] high complexity using standardized patient assessment instrument and/or measurable assessment of functional outcome.     [x] EVAL (LOW) 99776 (typically 15 minutes face-to-face)  [] EVAL (MOD) 91912 (typically 30 minutes face-to-face)  [] EVAL (HIGH) 83289 (typically 45 minutes face-to-face)  [] RE-EVAL     PLAN: Begin PT focusing on: proximal hip mobilizations, LB mobs, LB core activation, proximal hip activation, and HEP    Frequency/Duration:  2 days per week for 4 Weeks:  Interventions:  [x]  Therapeutic exercise including: strength training, ROM, for LE, Glutes and core   [x]  NMR activation and proprioception for glutes , LE and Core   [x]  Manual therapy as indicated for Hip complex, LE and spine to include: Dry Needling/IASTM, STM, PROM, Gr I-IV mobilizations, manipulation. [x]  Modalities as needed that may include: thermal agents, E-stim, Biofeedback, US, iontophoresis as indicated  [x]  Patient education on joint protection, postural re-education, activity modification, progression of HEP. HEP instruction: Written HEP instructions provided and reviewed     GOALS:  Patient stated goal: \"get rid of pain so I can return to exercise\"  [] Progressing: [] Met: [] Not Met: [] Adjusted    Therapist goals for Patient:   Short Term Goals: To be achieved in: 2 weeks  1. Independent in HEP and progression per patient tolerance, in order to prevent re-injury. [] Progressing: [] Met: [] Not Met: [] Adjusted  2. Patient will have a decrease in pain to facilitate improvement in movement, function, and ADLs as indicated by Functional Deficits. [] Progressing: [] Met: [] Not Met: [] Adjusted    Long Term Goals: To be achieved in: 4 weeks  1. Disability index score of 10% or less for the JUANJOSE to assist with reaching prior level of function. [] Progressing: [] Met: [] Not Met: [] Adjusted  2. Patient will demonstrate increased AROM to WNL, good LS mobility, good hip ROM to allow for proper joint functioning as indicated by patients Functional Deficits. [] Progressing: [] Met: [] Not Met: [] Adjusted  3.  Patient will demonstrate an increase in Strength to good proximal hip and core activation to allow for proper functional mobility as indicated by patients Functional Deficits. [] Progressing: [] Met: [] Not Met: [] Adjusted  4. Patient will return to functional activities including returning to running and pushups/pull upswithout increased symptoms or restriction.    [] Progressing: [] Met: [] Not Met: [] Adjusted       Electronically signed by:  Marietta Johnson PT

## 2021-04-28 NOTE — FLOWSHEET NOTE
[facility]  Phone: [phone]   Fax: [phone]    Physical Therapy Daily Treatment Note  Date:  2021    Patient Name:  Kiko Coats    :  1980  MRN: [id number]  Medical/Treatment Diagnosis Information:  · Diagnosis: herniated lumbar disc  ·    Insurance/Certification information:  PT Insurance Information: [employer], 60/40 plan, 60 visits a year  Physician Information:  Referring Practitioner: MARY Luong  Plan of care signed (Y/N): sent this date    Date of Patient follow up with Physician:     Functional scale[de-identified] raw score = 50; dysfunction = 24%    Progress Report: [x]  Yes  []  No     Date Range for reporting period:  Beginning   Ending     Progress report due (10 Rx/or 30 days whichever is less): visit #22 or     Recertification due (POC duration/ or 90 days whichever is less):      Visit # Insurance Allowable Auth required? Date Range    60 []  Yes  []  No         Latex Allergy:  [x]NO      []YES  Preferred Language for Healthcare:   [x]English       []other:      Pain level:  6/10     SUBJECTIVE:  See eval    OBJECTIVE: See eval      RESTRICTIONS/PRECAUTIONS:     Exercises/Interventions:     Therapeutic Exercises (03467) Resistance / level Sets/sec Reps Notes   HEP prescription   X---see below Patient demonstrated understanding and handout provided                                                           Therapeutic Activities (87307)                                          Neuromuscular Re-ed (29930)                                                 Manual Intervention ([id number])                                                     Modalities: lumbar traction next visit    Pt. Education:  -patient educated on diagnosis, prognosis and expectations for rehab  -all patient questions were answered    HEP instruction:  Access Code: [email or web address]  URL: [email or web address]  Date: 2021  Prepared by: Raleigh Wolf Ivon    Exercises  Supine Single Knee to Chest Stretch - 1 x daily - 7 x weekly - 1 sets - 10 reps - 5 hold  Prone Press Up on Elbows - 1 x daily - 7 x weekly - 1 sets - 10 reps - 5-10 hold  Standing Hamstring Stretch on Chair - 1 x daily - 7 x weekly - 1 sets - 5 reps - 30 hold  Also dynamic hamstring stretch in supine (threading) x 20 B at least once a day    Therapeutic Exercise and NMR EXR  [x] (50259) Provided verbal/tactile cueing for activities related to strengthening, flexibility, endurance, ROM for improvements in  [x] LE / Lumbar: LE, proximal hip, and core control with self care, mobility, lifting, ambulation. [] UE / Cervical: cervical, postural, scapular, scapulothoracic and UE control with self care, reaching, carrying, lifting, house/yardwork, driving, computer work.  [] (83408) Provided verbal/tactile cueing for activities related to improving balance, coordination, kinesthetic sense, posture, motor skill, proprioception to assist with   [] LE / lumbar: LE, proximal hip, and core control in self care, mobility, lifting, ambulation and eccentric single leg control. [] UE / cervical: cervical, scapular, scapulothoracic and UE control with self care, reaching, carrying, lifting, house/yardwork, driving, computer work.   [] (09590) Therapist is in constant attendance of 2 or more patients providing skilled therapy interventions, but not providing any significant amount of measurable one-on-one time to either patient, for improvements in  [] LE / lumbar: LE, proximal hip, and core control in self care, mobility, lifting, ambulation and eccentric single leg control. [] UE / cervical: cervical, scapular, scapulothoracic and UE control with self care, reaching, carrying, lifting, house/yardwork, driving, computer work.      NMR and Therapeutic Activities:    [x] (88987 or 40686) Provided verbal/tactile cueing for activities related to improving balance, coordination, kinesthetic sense, posture, motor skill, proprioception and motor activation to allow for proper function of   [x] LE: / Lumbar core, proximal hip and LE with self care and ADLs  [] UE / Cervical: cervical, postural, scapular, scapulothoracic and UE control with self care, carrying, lifting, driving, computer work.   [] (98465) Gait Re-education- Provided training and instruction to the patient for proper LE, core and proximal hip recruitment and positioning and eccentric body weight control with ambulation re-education including up and down stairs     Home Management Training / Self Care:  [] (70409) Provided self-care/home management training related to activities of daily living and compensatory training, and/or use of adaptive equipment for improvement with: ADLs and compensatory training, meal preparation, safety procedures and instruction in use of adaptive equipment, including bathing, grooming, dressing, personal hygiene, basic household cleaning and chores.      Home Exercise Program:    [x] (77533) Reviewed/Progressed HEP activities related to strengthening, flexibility, endurance, ROM of   [] LE / Lumbar: core, proximal hip and LE for functional self-care, mobility, lifting and ambulation/stair navigation   [] UE / Cervical: cervical, postural, scapular, scapulothoracic and UE control with self care, reaching, carrying, lifting, house/yardwork, driving, computer work  [] (16200)Reviewed/Progressed HEP activities related to improving balance, coordination, kinesthetic sense, posture, motor skill, proprioception of   [] LE: core, proximal hip and LE for self care, mobility, lifting, and ambulation/stair navigation    [] UE / Cervical: cervical, postural,  scapular, scapulothoracic and UE control with self care, reaching, carrying, lifting, house/yardwork, driving, computer work    Manual Treatments:  PROM / STM / Oscillations-Mobs:  G-I, II, III, IV (PA's, Inf., Post.)  [] (27921) Provided manual therapy to mobilize LE, proximal hip and/or LS spine soft tissue/joints for the purpose of modulating pain, promoting relaxation,  increasing ROM, reducing/eliminating soft tissue swelling/inflammation/restriction, improving soft tissue extensibility and allowing for proper ROM for normal function with   [] LE / lumbar: self care, mobility, lifting and ambulation. [] UE / Cervical: self care, reaching, carrying, lifting, house/yardwork, driving, computer work. Modalities:  [] (56354) Vasopneumatic compression: Utilized vasopneumatic compression to decrease edema / swelling for the purpose of improving mobility and quad tone / recruitment which will allow for increased overall function including but not limited to self-care, transfers, ambulation, and ascending / descending stairs. Charges:  Timed Code Treatment Minutes: 30   Total Treatment Minutes: 45     [x] EVAL - LOW (95493)   [] EVAL - MOD (80665)  [] EVAL - HIGH (83751)  [] RE-EVAL (01060)  [x] YY(38213) x   1    [] Ionto  [] NMR (64453) x       [] Vaso  [] Manual (97086) x       [] Ultrasound  [x] TA x   1     [] Mech Traction (76817)  [] Aquatic Therapy x     [] ES (un) (59162):   [] Home Management Training x  [] ES(attended) (05517)   [] Dry Needling 1-2 muscles (48860):  [] Dry Needling 3+ muscles (879887  [] Group:      [] Other:     GOALS:   GOALS:  Patient stated goal: \"get rid of pain so I can return to exercise\"  [] Progressing: [] Met: [] Not Met: [] Adjusted    Therapist goals for Patient:   Short Term Goals: To be achieved in: 2 weeks  1. Independent in HEP and progression per patient tolerance, in order to prevent re-injury. [] Progressing: [] Met: [] Not Met: [] Adjusted  2. Patient will have a decrease in pain to facilitate improvement in movement, function, and ADLs as indicated by Functional Deficits. [] Progressing: [] Met: [] Not Met: [] Adjusted    Long Term Goals: To be achieved in: 4 weeks  1.  Disability index score of 10% or less for the JUANJOSE to assist with reaching prior level of function. [] Progressing: [] Met: [] Not Met: [] Adjusted  2. Patient will demonstrate increased AROM to WNL, good LS mobility, good hip ROM to allow for proper joint functioning as indicated by patients Functional Deficits. [] Progressing: [] Met: [] Not Met: [] Adjusted  3. Patient will demonstrate an increase in Strength to good proximal hip and core activation to allow for proper functional mobility as indicated by patients Functional Deficits. [] Progressing: [] Met: [] Not Met: [] Adjusted  4. Patient will return to functional activities including returning to running and pushups/pull upswithout increased symptoms or restriction. [] Progressing: [] Met: [] Not Met: [] Adjusted         Overall Progression Towards Functional goals/ Treatment Progress Update:  [] Patient is progressing as expected towards functional goals listed. [] Progression is slowed due to complexities/Impairments listed. [] Progression has been slowed due to co-morbidities. [x] Plan just implemented, too soon to assess goals progression <30days   [] Goals require adjustment due to lack of progress  [] Patient is not progressing as expected and requires additional follow up with physician  [] Other    Persisting Functional Limitations/Impairments:  []Sleeping [x]Sitting               [x]Standing [x]Transfers        [x]Walking []Kneeling               []Stairs [x]Squatting / bending   [x]ADLs []Reaching  [x]Lifting  []Housework  []Driving [x]Job related tasks  [x]Sports/Recreation []Other:        ASSESSMENT:  See eval  Treatment/Activity Tolerance:  [x] Patient able to complete tx [] Patient limited by fatigue  [] Patient limited by pain  [] Patient limited by other medical complications  [] Other:     Prognosis: [x] Good [] Fair  [] Poor    Patient Requires Follow-up: [x] Yes  [] No    Plan for next treatment session:    PLAN: See eval. PT 2x / week for 4 weeks.    [] Continue per plan of care [] Laverne Cleary current plan (see comments)  [x] Plan of care initiated [] Hold pending MD visit [] Discharge    Electronically signed by: Marietta Johnson PT, DPT    Note: If patient does not return for scheduled/ recommended follow up visits, this note will serve as a discharge from care along with most recent update on progress.

## 2021-04-30 ENCOUNTER — HOSPITAL ENCOUNTER (OUTPATIENT)
Dept: PHYSICAL THERAPY | Age: 41
Setting detail: THERAPIES SERIES
Discharge: HOME | End: 2021-04-30
Payer: COMMERCIAL

## 2021-04-30 PROCEDURE — 97110 THERAPEUTIC EXERCISES: CPT

## 2021-04-30 PROCEDURE — 97012 MECHANICAL TRACTION THERAPY: CPT

## 2021-04-30 NOTE — FLOWSHEET NOTE
[facility]  Phone: [phone]   Fax: [phone]    Physical Therapy Daily Treatment Note  Date:  2021    Patient Name:  Albert Carrasco    :  1980  MRN: [id number]  Medical/Treatment Diagnosis Information:  · Diagnosis: herniated lumbar disc     Insurance/Certification information:  PT Insurance Information: [employer], 60/40 plan, 60 visits a year  Physician Information:  Referring Practitioner: MARY Hermosillo  Plan of care signed (Y/N): sent this date    Date of Patient follow up with Physician:     Functional scale[de-identified] raw score = 50; dysfunction = 24%    Progress Report: [x]  Yes  []  No     Date Range for reporting period:  Beginning   Ending     Progress report due (10 Rx/or 30 days whichever is less): visit #21 or     Recertification due (POC duration/ or 90 days whichever is less):      Visit # Insurance Allowable Auth required?  Date Range    60 []  Yes  []  No         Latex Allergy:  [x]NO      []YES  Preferred Language for Healthcare:   [x]English       []other:      Pain level:  4/10     SUBJECTIVE:  Reporting indep with HEP; says that he has been \"bracing\" for sneezing, which has helped    OBJECTIVE: antalgia due to left gluteal pain, sciatica      RESTRICTIONS/PRECAUTIONS:     Exercises/Interventions:     Therapeutic Exercises (16360) Resistance / level Sets/sec Reps Notes   HEP prescription   X---see below Patient demonstrated understanding and handout provided   bike  6 min     At step---HSS and hip flexor str L  20 sec X 2 each    Seated nerve flossing  3 20    Supine knee to chest  5 sec holds 10    Prayer stretch---central and paracentral  30 sec each X 3 each    Prone on elbows ext   10 sec X 10                  Therapeutic Activities (33954)                                          Neuromuscular Re-ed (60364)                                                 Manual Intervention ([id number]) Modalities: 4/30: mechanical lumbar traction 110#/70# at 30 sec/10 sec respectively x 10 min in supine position with table closed. PT with patient to monitor the entire time and automatic release button in patient's hand during treatment. No reports of increased pain after treatment, tolerated without complaint. Plan to increase time on traction next visit. Pt. Education:  -patient educated on diagnosis, prognosis and expectations for rehab  -all patient questions were answered    HEP instruction:  4/30: added nerve flossing for home    Access Code: [email or web address]  URL: [email or web address]  Date: 04/28/2021  Prepared by: Bronson Mcknight    Exercises  Supine Single Knee to Chest Stretch - 1 x daily - 7 x weekly - 1 sets - 10 reps - 5 hold  Prone Press Up on Elbows - 1 x daily - 7 x weekly - 1 sets - 10 reps - 5-10 hold  Standing Hamstring Stretch on Chair - 1 x daily - 7 x weekly - 1 sets - 5 reps - 30 hold  Also dynamic hamstring stretch in supine (threading) x 20 B at least once a day    Therapeutic Exercise and NMR EXR  [x] (24933) Provided verbal/tactile cueing for activities related to strengthening, flexibility, endurance, ROM for improvements in  [x] LE / Lumbar: LE, proximal hip, and core control with self care, mobility, lifting, ambulation. [] UE / Cervical: cervical, postural, scapular, scapulothoracic and UE control with self care, reaching, carrying, lifting, house/yardwork, driving, computer work.  [] (04214) Provided verbal/tactile cueing for activities related to improving balance, coordination, kinesthetic sense, posture, motor skill, proprioception to assist with   [] LE / lumbar: LE, proximal hip, and core control in self care, mobility, lifting, ambulation and eccentric single leg control.    [] UE / cervical: cervical, scapular, scapulothoracic and UE control with self care, reaching, carrying, lifting, house/yardwork, driving, computer work.   [] (59476) Therapist control with self care, reaching, carrying, lifting, house/yardwork, driving, computer work  [] (52581)Reviewed/Progressed HEP activities related to improving balance, coordination, kinesthetic sense, posture, motor skill, proprioception of   [] LE: core, proximal hip and LE for self care, mobility, lifting, and ambulation/stair navigation    [] UE / Cervical: cervical, postural,  scapular, scapulothoracic and UE control with self care, reaching, carrying, lifting, house/yardwork, driving, computer work    Manual Treatments:  PROM / STM / Oscillations-Mobs:  G-I, II, III, IV (PA's, Inf., Post.)  [] (92953) Provided manual therapy to mobilize LE, proximal hip and/or LS spine soft tissue/joints for the purpose of modulating pain, promoting relaxation,  increasing ROM, reducing/eliminating soft tissue swelling/inflammation/restriction, improving soft tissue extensibility and allowing for proper ROM for normal function with   [] LE / lumbar: self care, mobility, lifting and ambulation. [] UE / Cervical: self care, reaching, carrying, lifting, house/yardwork, driving, computer work. Modalities:  [] (69210) Vasopneumatic compression: Utilized vasopneumatic compression to decrease edema / swelling for the purpose of improving mobility and quad tone / recruitment which will allow for increased overall function including but not limited to self-care, transfers, ambulation, and ascending / descending stairs.        Charges:  Timed Code Treatment Minutes: 35   Total Treatment Minutes: 45     [] EVAL - LOW (28654)   [] EVAL - MOD (86122)  [] EVAL - HIGH (34644)  [] RE-EVAL (84397)  [x] BN(94119) x   2    [] Ionto  [] NMR (82530) x       [] Vaso  [] Manual (46021) x       [] Ultrasound  [] TA x        [x] Mech Traction (78511)  [] Aquatic Therapy x     [] ES (un) (79930):   [] Home Management Training x  [] ES(attended) (84653)   [] Dry Needling 1-2 muscles (17032):  [] Dry Needling 3+ muscles (294629  [] Group:      [] Other:     GOALS:   GOALS:  Patient stated goal: \"get rid of pain so I can return to exercise\"  [] Progressing: [] Met: [] Not Met: [] Adjusted    Therapist goals for Patient:   Short Term Goals: To be achieved in: 2 weeks  1. Independent in HEP and progression per patient tolerance, in order to prevent re-injury. [] Progressing: [] Met: [] Not Met: [] Adjusted  2. Patient will have a decrease in pain to facilitate improvement in movement, function, and ADLs as indicated by Functional Deficits. [] Progressing: [] Met: [] Not Met: [] Adjusted    Long Term Goals: To be achieved in: 4 weeks  1. Disability index score of 10% or less for the JUANJOSE to assist with reaching prior level of function. [] Progressing: [] Met: [] Not Met: [] Adjusted  2. Patient will demonstrate increased AROM to WNL, good LS mobility, good hip ROM to allow for proper joint functioning as indicated by patients Functional Deficits. [] Progressing: [] Met: [] Not Met: [] Adjusted  3. Patient will demonstrate an increase in Strength to good proximal hip and core activation to allow for proper functional mobility as indicated by patients Functional Deficits. [] Progressing: [] Met: [] Not Met: [] Adjusted  4. Patient will return to functional activities including returning to running and pushups/pull upswithout increased symptoms or restriction. [] Progressing: [] Met: [] Not Met: [] Adjusted         Overall Progression Towards Functional goals/ Treatment Progress Update:  [] Patient is progressing as expected towards functional goals listed. [] Progression is slowed due to complexities/Impairments listed. [] Progression has been slowed due to co-morbidities.   [x] Plan just implemented, too soon to assess goals progression <30days   [] Goals require adjustment due to lack of progress  [] Patient is not progressing as expected and requires additional follow up with physician  [] Other    Persisting Functional Limitations/Impairments:  []Sleeping [x]Sitting               [x]Standing [x]Transfers        [x]Walking []Kneeling               []Stairs [x]Squatting / bending   [x]ADLs []Reaching  [x]Lifting  []Housework  []Driving [x]Job related tasks  [x]Sports/Recreation []Other:        ASSESSMENT:  See eval  Treatment/Activity Tolerance:  [x] Patient able to complete tx [] Patient limited by fatigue  [] Patient limited by pain  [] Patient limited by other medical complications  [] Other:     Prognosis: [x] Good [] Fair  [] Poor    Patient Requires Follow-up: [x] Yes  [] No    Plan for next treatment session:    PLAN: See eval. PT 2x / week for 4 weeks. [x] Continue per plan of care [] Alter current plan (see comments)  [] Plan of care initiated [] Hold pending MD visit [] Discharge    Electronically signed by: Leandro Granados PT, DPT    Note: If patient does not return for scheduled/ recommended follow up visits, this note will serve as a discharge from care along with most recent update on progress.

## 2021-05-05 ENCOUNTER — HOSPITAL ENCOUNTER (OUTPATIENT)
Dept: PHYSICAL THERAPY | Age: 41
Setting detail: THERAPIES SERIES
Discharge: HOME | End: 2021-05-05
Payer: COMMERCIAL

## 2021-05-05 PROCEDURE — 97110 THERAPEUTIC EXERCISES: CPT

## 2021-05-05 PROCEDURE — 97012 MECHANICAL TRACTION THERAPY: CPT

## 2021-05-05 NOTE — FLOWSHEET NOTE
Neuromuscular Re-ed (51517)                                                 Manual Intervention (13384)                                                     Modalities: 5/5: mechanical lumbar traction 120#/80# at 30 sec/10 sec respectively x 15 min in supine position with table closed and knees supported. PT with patient to monitor the entire time and automatic release button in patient's hand during treatment. No reports of increased pain after treatment, tolerated without complaint. Pt. Education:  -patient educated on diagnosis, prognosis and expectations for rehab  -all patient questions were answered    HEP instruction:  4/30: added nerve flossing for home    Access Code: [email or web address]  URL: [email or web address]  Date: 04/28/2021  Prepared by: Sim Reddy    Exercises  Supine Single Knee to Chest Stretch - 1 x daily - 7 x weekly - 1 sets - 10 reps - 5 hold  Prone Press Up on Elbows - 1 x daily - 7 x weekly - 1 sets - 10 reps - 5-10 hold  Standing Hamstring Stretch on Chair - 1 x daily - 7 x weekly - 1 sets - 5 reps - 30 hold  Also dynamic hamstring stretch in supine (threading) x 20 B at least once a day    Therapeutic Exercise and NMR EXR  [x] (30955) Provided verbal/tactile cueing for activities related to strengthening, flexibility, endurance, ROM for improvements in  [x] LE / Lumbar: LE, proximal hip, and core control with self care, mobility, lifting, ambulation. [] UE / Cervical: cervical, postural, scapular, scapulothoracic and UE control with self care, reaching, carrying, lifting, house/yardwork, driving, computer work.  [] (17580) Provided verbal/tactile cueing for activities related to improving balance, coordination, kinesthetic sense, posture, motor skill, proprioception to assist with   [] LE / lumbar: LE, proximal hip, and core control in self care, mobility, lifting, ambulation and eccentric single leg control.    [] UE / cervical: cervical, scapular, scapulothoracic and UE control with self care, reaching, carrying, lifting, house/yardwork, driving, computer work.   [] (69911) Therapist is in constant attendance of 2 or more patients providing skilled therapy interventions, but not providing any significant amount of measurable one-on-one time to either patient, for improvements in  [] LE / lumbar: LE, proximal hip, and core control in self care, mobility, lifting, ambulation and eccentric single leg control. [] UE / cervical: cervical, scapular, scapulothoracic and UE control with self care, reaching, carrying, lifting, house/yardwork, driving, computer work. NMR and Therapeutic Activities:    [x] (33329 or 51089) Provided verbal/tactile cueing for activities related to improving balance, coordination, kinesthetic sense, posture, motor skill, proprioception and motor activation to allow for proper function of   [x] LE: / Lumbar core, proximal hip and LE with self care and ADLs  [] UE / Cervical: cervical, postural, scapular, scapulothoracic and UE control with self care, carrying, lifting, driving, computer work.   [] (10698) Gait Re-education- Provided training and instruction to the patient for proper LE, core and proximal hip recruitment and positioning and eccentric body weight control with ambulation re-education including up and down stairs     Home Management Training / Self Care:  [] (88715) Provided self-care/home management training related to activities of daily living and compensatory training, and/or use of adaptive equipment for improvement with: ADLs and compensatory training, meal preparation, safety procedures and instruction in use of adaptive equipment, including bathing, grooming, dressing, personal hygiene, basic household cleaning and chores.      Home Exercise Program:    [x] (61228) Reviewed/Progressed HEP activities related to strengthening, flexibility, endurance, ROM of   [] LE / Lumbar: core, proximal hip and LE for functional self-care, mobility, lifting and ambulation/stair navigation   [] UE / Cervical: cervical, postural, scapular, scapulothoracic and UE control with self care, reaching, carrying, lifting, house/yardwork, driving, computer work  [] (34106)Reviewed/Progressed HEP activities related to improving balance, coordination, kinesthetic sense, posture, motor skill, proprioception of   [] LE: core, proximal hip and LE for self care, mobility, lifting, and ambulation/stair navigation    [] UE / Cervical: cervical, postural,  scapular, scapulothoracic and UE control with self care, reaching, carrying, lifting, house/yardwork, driving, computer work    Manual Treatments:  PROM / STM / Oscillations-Mobs:  G-I, II, III, IV (PA's, Inf., Post.)  [] (19855) Provided manual therapy to mobilize LE, proximal hip and/or LS spine soft tissue/joints for the purpose of modulating pain, promoting relaxation,  increasing ROM, reducing/eliminating soft tissue swelling/inflammation/restriction, improving soft tissue extensibility and allowing for proper ROM for normal function with   [] LE / lumbar: self care, mobility, lifting and ambulation. [] UE / Cervical: self care, reaching, carrying, lifting, house/yardwork, driving, computer work. Modalities:  [] (77859) Vasopneumatic compression: Utilized vasopneumatic compression to decrease edema / swelling for the purpose of improving mobility and quad tone / recruitment which will allow for increased overall function including but not limited to self-care, transfers, ambulation, and ascending / descending stairs.        Charges:  Timed Code Treatment Minutes: 30   Total Treatment Minutes: 45     [] EVAL - LOW (20225)   [] EVAL - MOD (35089)  [] EVAL - HIGH (42043)  [] RE-EVAL (92524)  [x] ND(20517) x   2    [] Ionto  [] NMR (73679) x       [] Vaso  [] Manual (80113) x       [] Ultrasound  [] TA x        [x] Mech Traction (98597)  [] Aquatic Therapy x     [] ES (un) (90394):   [] Home Management Training x  [] ES(attended) (89156)   [] Dry Needling 1-2 muscles (89501):  [] Dry Needling 3+ muscles (196521  [] Group:      [] Other:     GOALS:   GOALS:  Patient stated goal: \"get rid of pain so I can return to exercise\"  [] Progressing: [] Met: [] Not Met: [] Adjusted    Therapist goals for Patient:   Short Term Goals: To be achieved in: 2 weeks  1. Independent in HEP and progression per patient tolerance, in order to prevent re-injury. [] Progressing: [] Met: [] Not Met: [] Adjusted  2. Patient will have a decrease in pain to facilitate improvement in movement, function, and ADLs as indicated by Functional Deficits. [] Progressing: [] Met: [] Not Met: [] Adjusted    Long Term Goals: To be achieved in: 4 weeks  1. Disability index score of 10% or less for the JUANJOSE to assist with reaching prior level of function. [] Progressing: [] Met: [] Not Met: [] Adjusted  2. Patient will demonstrate increased AROM to WNL, good LS mobility, good hip ROM to allow for proper joint functioning as indicated by patients Functional Deficits. [] Progressing: [] Met: [] Not Met: [] Adjusted  3. Patient will demonstrate an increase in Strength to good proximal hip and core activation to allow for proper functional mobility as indicated by patients Functional Deficits. [] Progressing: [] Met: [] Not Met: [] Adjusted  4. Patient will return to functional activities including returning to running and pushups/pull upswithout increased symptoms or restriction. [] Progressing: [] Met: [] Not Met: [] Adjusted         Overall Progression Towards Functional goals/ Treatment Progress Update:  [] Patient is progressing as expected towards functional goals listed. [] Progression is slowed due to complexities/Impairments listed. [] Progression has been slowed due to co-morbidities.   [x] Plan just implemented, too soon to assess goals progression <30days   [] Goals require adjustment due to lack of progress  [] Patient is not progressing as expected and requires additional follow up with physician  [] Other    Persisting Functional Limitations/Impairments:  []Sleeping [x]Sitting               [x]Standing [x]Transfers        [x]Walking []Kneeling               []Stairs [x]Squatting / bending   [x]ADLs []Reaching  [x]Lifting  []Housework  []Driving [x]Job related tasks  [x]Sports/Recreation []Other:        ASSESSMENT:  See eval  Treatment/Activity Tolerance:  [x] Patient able to complete tx [] Patient limited by fatigue  [] Patient limited by pain  [] Patient limited by other medical complications  [] Other:     Prognosis: [x] Good [] Fair  [] Poor    Patient Requires Follow-up: [x] Yes  [] No    Plan for next treatment session:    PLAN: See eval. PT 2x / week for 4 weeks. [x] Continue per plan of care [] Alter current plan (see comments)  [] Plan of care initiated [] Hold pending MD visit [] Discharge    Electronically signed by: Disha Gama PT, DPT    Note: If patient does not return for scheduled/ recommended follow up visits, this note will serve as a discharge from care along with most recent update on progress.

## 2021-05-07 ENCOUNTER — HOSPITAL ENCOUNTER (OUTPATIENT)
Dept: PHYSICAL THERAPY | Age: 41
Setting detail: THERAPIES SERIES
Discharge: HOME | End: 2021-05-07
Payer: COMMERCIAL

## 2021-05-07 PROCEDURE — 97140 MANUAL THERAPY 1/> REGIONS: CPT

## 2021-05-07 PROCEDURE — 97110 THERAPEUTIC EXERCISES: CPT

## 2021-05-07 NOTE — FLOWSHEET NOTE
[address] [facility] Physical Therapy  Phone: [phone]   Fax: [phone]    Physical Therapy Daily Treatment Note  Date:  2021    Patient Name:  Viv Arteaga    :  1980  MRN: [id number]  Medical/Treatment Diagnosis Information:  · Diagnosis: herniated lumbar disc     Insurance/Certification information:  PT Insurance Information: [employer], 60/40 plan, 60 visits a year  Physician Information:  Referring Practitioner: MARY Mina  Plan of care signed (Y/N): Y    Date of Patient follow up with Physician:     Functional scale[de-identified] raw score = ; dysfunction = 24%    Progress Report: []  Yes  [x]  No     Date Range for reporting period:  Beginning   Ending     Progress report due (10 Rx/or 30 days whichever is less): visit #45 or     Recertification due (POC duration/ or 90 days whichever is less):      Visit # Insurance Allowable Auth required? Date Range    60 []  Yes  []  No         Latex Allergy:  [x]NO      []YES  Preferred Language for Healthcare:   [x]English       []other:      Pain level:  6/10     SUBJECTIVE: returns reporting an increase in pain that started last night and kept him from sleep last night. Pain is staying in the deep left hip posteriorly. Continues to make transitions difficult and is walking with a limp.     OBJECTIVE: antalgia due to left gluteal pain, sciatica      RESTRICTIONS/PRECAUTIONS:     Exercises/Interventions:     Therapeutic Exercises (09690) Resistance / level Sets/sec Reps Notes   Mat exercises:  TA iso  TA with march  TA with leg ext  Bridging  SKC  Supine piriformis    3 sec      3 sec  3 sec  3 sec   10  10  10  5  10  10    bike       At step---HSS and hip flexor str L     Seated nerve flossing     Supine knee to chest     Prayer stretch---central and paracentral     Prone on elbows ext      Piriformis str sitting     IR str at B hips in kneeling     Therapeutic Activities (18356)       Groin stretch in leg control. [] UE / cervical: cervical, scapular, scapulothoracic and UE control with self care, reaching, carrying, lifting, house/yardwork, driving, computer work.   [] (01479) Therapist is in constant attendance of 2 or more patients providing skilled therapy interventions, but not providing any significant amount of measurable one-on-one time to either patient, for improvements in  [] LE / lumbar: LE, proximal hip, and core control in self care, mobility, lifting, ambulation and eccentric single leg control. [] UE / cervical: cervical, scapular, scapulothoracic and UE control with self care, reaching, carrying, lifting, house/yardwork, driving, computer work. NMR and Therapeutic Activities:    [x] (47470 or 25539) Provided verbal/tactile cueing for activities related to improving balance, coordination, kinesthetic sense, posture, motor skill, proprioception and motor activation to allow for proper function of   [x] LE: / Lumbar core, proximal hip and LE with self care and ADLs  [] UE / Cervical: cervical, postural, scapular, scapulothoracic and UE control with self care, carrying, lifting, driving, computer work.   [] (87396) Gait Re-education- Provided training and instruction to the patient for proper LE, core and proximal hip recruitment and positioning and eccentric body weight control with ambulation re-education including up and down stairs     Home Management Training / Self Care:  [] (56682) Provided self-care/home management training related to activities of daily living and compensatory training, and/or use of adaptive equipment for improvement with: ADLs and compensatory training, meal preparation, safety procedures and instruction in use of adaptive equipment, including bathing, grooming, dressing, personal hygiene, basic household cleaning and chores.      Home Exercise Program:    [x] (61458) Reviewed/Progressed HEP activities related to strengthening, flexibility, endurance, ROM of   [] LE / Lumbar: core, proximal hip and LE for functional self-care, mobility, lifting and ambulation/stair navigation   [] UE / Cervical: cervical, postural, scapular, scapulothoracic and UE control with self care, reaching, carrying, lifting, house/yardwork, driving, computer work  [] (81421)Reviewed/Progressed HEP activities related to improving balance, coordination, kinesthetic sense, posture, motor skill, proprioception of   [] LE: core, proximal hip and LE for self care, mobility, lifting, and ambulation/stair navigation    [] UE / Cervical: cervical, postural,  scapular, scapulothoracic and UE control with self care, reaching, carrying, lifting, house/yardwork, driving, computer work    Manual Treatments:  PROM / STM / Oscillations-Mobs:  G-I, II, III, IV (PA's, Inf., Post.)  [] (78245) Provided manual therapy to mobilize LE, proximal hip and/or LS spine soft tissue/joints for the purpose of modulating pain, promoting relaxation,  increasing ROM, reducing/eliminating soft tissue swelling/inflammation/restriction, improving soft tissue extensibility and allowing for proper ROM for normal function with   [] LE / lumbar: self care, mobility, lifting and ambulation. [] UE / Cervical: self care, reaching, carrying, lifting, house/yardwork, driving, computer work. Modalities:  [] (25960) Vasopneumatic compression: Utilized vasopneumatic compression to decrease edema / swelling for the purpose of improving mobility and quad tone / recruitment which will allow for increased overall function including but not limited to self-care, transfers, ambulation, and ascending / descending stairs.        Charges:  Timed Code Treatment Minutes: 30   Total Treatment Minutes: 45     [] EVAL - LOW (21106)   [] EVAL - MOD (89481)  [] EVAL - HIGH (76711)  [] RE-EVAL (93288)  [x] NH(27568) x   2    [] Ionto  [] NMR (16090) x       [] Vaso  [] Manual (13535) x       [] Ultrasound  [] TA x        [x] Mech Traction (74154)  [] Aquatic Therapy x     [] ES (un) (23572):   [] Home Management Training x  [] ES(attended) (66957)   [] Dry Needling 1-2 muscles (91105):  [] Dry Needling 3+ muscles (201105  [] Group:      [] Other:     GOALS:   GOALS:  Patient stated goal: \"get rid of pain so I can return to exercise\"  [] Progressing: [] Met: [] Not Met: [] Adjusted    Therapist goals for Patient:   Short Term Goals: To be achieved in: 2 weeks  1. Independent in HEP and progression per patient tolerance, in order to prevent re-injury. [] Progressing: [] Met: [] Not Met: [] Adjusted  2. Patient will have a decrease in pain to facilitate improvement in movement, function, and ADLs as indicated by Functional Deficits. [] Progressing: [] Met: [] Not Met: [] Adjusted    Long Term Goals: To be achieved in: 4 weeks  1. Disability index score of 10% or less for the JUANJOSE to assist with reaching prior level of function. [] Progressing: [] Met: [] Not Met: [] Adjusted  2. Patient will demonstrate increased AROM to WNL, good LS mobility, good hip ROM to allow for proper joint functioning as indicated by patients Functional Deficits. [] Progressing: [] Met: [] Not Met: [] Adjusted  3. Patient will demonstrate an increase in Strength to good proximal hip and core activation to allow for proper functional mobility as indicated by patients Functional Deficits. [] Progressing: [] Met: [] Not Met: [] Adjusted  4. Patient will return to functional activities including returning to running and pushups/pull upswithout increased symptoms or restriction. [] Progressing: [] Met: [] Not Met: [] Adjusted         Overall Progression Towards Functional goals/ Treatment Progress Update:  [] Patient is progressing as expected towards functional goals listed. [] Progression is slowed due to complexities/Impairments listed. [] Progression has been slowed due to co-morbidities.   [x] Plan just implemented, too soon to assess goals progression <30days   [] Goals require

## 2021-05-12 ENCOUNTER — HOSPITAL ENCOUNTER (OUTPATIENT)
Dept: PHYSICAL THERAPY | Age: 41
Setting detail: THERAPIES SERIES
Discharge: HOME | End: 2021-05-12
Payer: COMMERCIAL

## 2021-05-12 PROCEDURE — 97112 NEUROMUSCULAR REEDUCATION: CPT

## 2021-05-12 PROCEDURE — 97110 THERAPEUTIC EXERCISES: CPT

## 2021-05-12 NOTE — FLOWSHEET NOTE
[facility] Physical Therapy  Phone: [phone]   Fax: [phone]    Physical Therapy Daily Treatment Note  Date:  2021    Patient Name:  Ellen Vivar    :  1980  MRN: [id number]  Medical/Treatment Diagnosis Information:  · Diagnosis: herniated lumbar disc     Insurance/Certification information:  PT Insurance Information: [employer], 60/40 plan, 60 visits a year  Physician Information:  Referring Practitioner: MARY Butt  Plan of care signed (Y/N): Y    Date of Patient follow up with Physician:     Functional scale[de-identified] raw score = 50; dysfunction = 24%    Progress Report: []  Yes  [x]  No     Date Range for reporting period:  Beginning   Ending     Progress report due (10 Rx/or 30 days whichever is less): visit #06 or     Recertification due (POC duration/ or 90 days whichever is less):      Visit # Insurance Allowable Auth required? Date Range    60 []  Yes  []  No         Latex Allergy:  [x]NO      []YES  Preferred Language for Healthcare:   [x]English       []other:      Pain level:  4/10     SUBJECTIVE: Patient reporting improvement in symptoms. Did not need to make appointment with ortho due to decreasing symptoms. No hip pain today, primary location of pain is central and left central lumbar today.    OBJECTIVE: no limp today; transition from sit to stand much improved      RESTRICTIONS/PRECAUTIONS:     Exercises/Interventions:     Therapeutic Exercises (61377) Resistance / level Sets/sec Reps Notes   Mat exercises:  TA iso  TA with march  TA with leg ext  Bridging  Bridge with kick  SKC  Supine piriformis          5 sec  3 sec       10  10      bike  4 min     At step---HSS and hip flexor str L     Seated nerve flossing     Cables:  Anti rot walkouts with punch  Anti rot partial squats   2.5 pl  2.5 pl X 5 each  X 10 B   Step ups and lateral step ups with march  X 10 B each   SLS with hip flex opp  10 secX 3    Half kneel in tandem 10 sec X 5 Then added OH arms B x 10 each    Plank to prone on elbows  10 sec/10 sec X 5    Therapeutic Activities (34905)       Groin stretch in deep squat with reach     Advised in symmetry of WB both in motion and static stand/sit   x                         Neuromuscular Re-ed (92546)                                                 Manual Intervention ([id number])       Long leg distraction left hip                                              Modalities: 5/5: mechanical lumbar traction 120#/80# at 30 sec/10 sec respectively x 15 min in supine position with table closed and knees supported. PT with patient to monitor the entire time and automatic release button in patient's hand during treatment. No reports of increased pain after treatment, tolerated without complaint. Pt. Education:  -patient educated on diagnosis, prognosis and expectations for rehab  -all patient questions were answered    HEP instruction:  4/30: added nerve flossing for home    Access Code: [email or web address]  URL: [email or web address]  Date: 04/28/2021  Prepared by: Mariella Cifuentes    Exercises  Supine Single Knee to Chest Stretch - 1 x daily - 7 x weekly - 1 sets - 10 reps - 5 hold  Prone Press Up on Elbows - 1 x daily - 7 x weekly - 1 sets - 10 reps - 5-10 hold  Standing Hamstring Stretch on Chair - 1 x daily - 7 x weekly - 1 sets - 5 reps - 30 hold  Also dynamic hamstring stretch in supine (threading) x 20 B at least once a day    Therapeutic Exercise and NMR EXR  [x] (97661) Provided verbal/tactile cueing for activities related to strengthening, flexibility, endurance, ROM for improvements in  [x] LE / Lumbar: LE, proximal hip, and core control with self care, mobility, lifting, ambulation.   [] UE / Cervical: cervical, postural, scapular, scapulothoracic and UE control with self care, reaching, carrying, lifting, house/yardwork, driving, computer work.  [] (70926) Provided verbal/tactile cueing for activities related to improving balance, dressing, personal hygiene, basic household cleaning and chores. Home Exercise Program:    [x] (46031) Reviewed/Progressed HEP activities related to strengthening, flexibility, endurance, ROM of   [] LE / Lumbar: core, proximal hip and LE for functional self-care, mobility, lifting and ambulation/stair navigation   [] UE / Cervical: cervical, postural, scapular, scapulothoracic and UE control with self care, reaching, carrying, lifting, house/yardwork, driving, computer work  [] (57771)Reviewed/Progressed HEP activities related to improving balance, coordination, kinesthetic sense, posture, motor skill, proprioception of   [] LE: core, proximal hip and LE for self care, mobility, lifting, and ambulation/stair navigation    [] UE / Cervical: cervical, postural,  scapular, scapulothoracic and UE control with self care, reaching, carrying, lifting, house/yardwork, driving, computer work    Manual Treatments:  PROM / STM / Oscillations-Mobs:  G-I, II, III, IV (PA's, Inf., Post.)  [] (68988) Provided manual therapy to mobilize LE, proximal hip and/or LS spine soft tissue/joints for the purpose of modulating pain, promoting relaxation,  increasing ROM, reducing/eliminating soft tissue swelling/inflammation/restriction, improving soft tissue extensibility and allowing for proper ROM for normal function with   [] LE / lumbar: self care, mobility, lifting and ambulation. [] UE / Cervical: self care, reaching, carrying, lifting, house/yardwork, driving, computer work. Modalities:  [] (61826) Vasopneumatic compression: Utilized vasopneumatic compression to decrease edema / swelling for the purpose of improving mobility and quad tone / recruitment which will allow for increased overall function including but not limited to self-care, transfers, ambulation, and ascending / descending stairs.        Charges:  Timed Code Treatment Minutes: 45   Total Treatment Minutes: 45     [] EVAL - LOW (32547)   [] EVAL - MOD (47815)  [] EVAL - HIGH (69632)  [] RE-EVAL (76858)  [x] PG(10916) x   2    [] Ionto  [x] NMR (70069) x 1      [] Vaso  [] Manual (17632) x       [] Ultrasound  [] TA x        [x] Mech Traction (73961)  [] Aquatic Therapy x     [] ES (un) (39782):   [] Home Management Training x  [] ES(attended) (48759)   [] Dry Needling 1-2 muscles (30601):  [] Dry Needling 3+ muscles (593149  [] Group:      [] Other:     GOALS:   GOALS:  Patient stated goal: \"get rid of pain so I can return to exercise\"  [] Progressing: [] Met: [] Not Met: [] Adjusted    Therapist goals for Patient:   Short Term Goals: To be achieved in: 2 weeks  1. Independent in HEP and progression per patient tolerance, in order to prevent re-injury. [] Progressing: [] Met: [] Not Met: [] Adjusted  2. Patient will have a decrease in pain to facilitate improvement in movement, function, and ADLs as indicated by Functional Deficits. [] Progressing: [] Met: [] Not Met: [] Adjusted    Long Term Goals: To be achieved in: 4 weeks  1. Disability index score of 10% or less for the JUANJOSE to assist with reaching prior level of function. [] Progressing: [] Met: [] Not Met: [] Adjusted  2. Patient will demonstrate increased AROM to WNL, good LS mobility, good hip ROM to allow for proper joint functioning as indicated by patients Functional Deficits. [] Progressing: [] Met: [] Not Met: [] Adjusted  3. Patient will demonstrate an increase in Strength to good proximal hip and core activation to allow for proper functional mobility as indicated by patients Functional Deficits. [] Progressing: [] Met: [] Not Met: [] Adjusted  4. Patient will return to functional activities including returning to running and pushups/pull upswithout increased symptoms or restriction. [] Progressing: [] Met: [] Not Met: [] Adjusted         Overall Progression Towards Functional goals/ Treatment Progress Update:  [] Patient is progressing as expected towards functional goals listed.     []

## 2021-05-14 ENCOUNTER — HOSPITAL ENCOUNTER (OUTPATIENT)
Dept: PHYSICAL THERAPY | Age: 41
Setting detail: THERAPIES SERIES
Discharge: HOME | End: 2021-05-14
Payer: COMMERCIAL

## 2021-05-14 PROCEDURE — 97140 MANUAL THERAPY 1/> REGIONS: CPT

## 2021-05-14 PROCEDURE — 97110 THERAPEUTIC EXERCISES: CPT

## 2021-05-14 PROCEDURE — 97112 NEUROMUSCULAR REEDUCATION: CPT

## 2021-05-14 NOTE — FLOWSHEET NOTE
[address] [facility] Physical Therapy  Phone: [phone]   Fax: [phone]    Physical Therapy Daily Treatment Note  Date:  2021    Patient Name:  Haylee Frank    :  1980  MRN: [id number]  Medical/Treatment Diagnosis Information:  · Diagnosis: herniated lumbar disc     Insurance/Certification information:  PT Insurance Information: [employer], 60/40 plan, 60 visits a year  Physician Information:  Referring Practitioner: MARY Christianson  Plan of care signed (Y/N): Y    Date of Patient follow up with Physician:     Functional scale[de-identified] raw score = ; dysfunction = 24%    Progress Report: []  Yes  [x]  No     Date Range for reporting period:  Beginning   Ending     Progress report due (10 Rx/or 30 days whichever is less): visit #58 or     Recertification due (POC duration/ or 90 days whichever is less):      Visit # Insurance Allowable Auth required? Date Range    60 []  Yes  []  No         Latex Allergy:  [x]NO      []YES  Preferred Language for Healthcare:   [x]English       []other:      Pain level:  3/10     SUBJECTIVE:moving better, less pain overall. Says he feels that he is not \"out of the woods\" yet because it seems to be \"on the edge\" of a flair up.   Location of pain today central lower back and left hip posteriorly  OBJECTIVE: no limp today; transition from sit to stand much improved      RESTRICTIONS/PRECAUTIONS:     Exercises/Interventions:     Therapeutic Exercises (00599) Resistance / level Sets/sec Reps Notes   Mat exercises:  TA iso  TA with march  TA with leg ext  Bridging  Bridge with kick  SKC  Supine piriformis          5 sec  3 sec       10  10      bike  5 min     At step---HSS and hip flexor str L     Seated nerve flossing     Cables:  Anti rot walkouts with punch  Anti rot partial squats on foam  Hip ext with sliders no hands  Hip abd with sliders no hands   3 pl  3 pl X 5 each  X 10 B  X 10 B  X 10 B   Step ups and lateral step self care, reaching, carrying, lifting, house/yardwork, driving, computer work.  [] (15509) Provided verbal/tactile cueing for activities related to improving balance, coordination, kinesthetic sense, posture, motor skill, proprioception to assist with   [] LE / lumbar: LE, proximal hip, and core control in self care, mobility, lifting, ambulation and eccentric single leg control. [] UE / cervical: cervical, scapular, scapulothoracic and UE control with self care, reaching, carrying, lifting, house/yardwork, driving, computer work.   [] (75156) Therapist is in constant attendance of 2 or more patients providing skilled therapy interventions, but not providing any significant amount of measurable one-on-one time to either patient, for improvements in  [] LE / lumbar: LE, proximal hip, and core control in self care, mobility, lifting, ambulation and eccentric single leg control. [] UE / cervical: cervical, scapular, scapulothoracic and UE control with self care, reaching, carrying, lifting, house/yardwork, driving, computer work.      NMR and Therapeutic Activities:    [x] (46678 or 34168) Provided verbal/tactile cueing for activities related to improving balance, coordination, kinesthetic sense, posture, motor skill, proprioception and motor activation to allow for proper function of   [x] LE: / Lumbar core, proximal hip and LE with self care and ADLs  [] UE / Cervical: cervical, postural, scapular, scapulothoracic and UE control with self care, carrying, lifting, driving, computer work.   [] (84678) Gait Re-education- Provided training and instruction to the patient for proper LE, core and proximal hip recruitment and positioning and eccentric body weight control with ambulation re-education including up and down stairs     Home Management Training / Self Care:  [] (19551) Provided self-care/home management training related to activities of daily living and compensatory training, and/or use of adaptive equipment for improvement with: ADLs and compensatory training, meal preparation, safety procedures and instruction in use of adaptive equipment, including bathing, grooming, dressing, personal hygiene, basic household cleaning and chores. Home Exercise Program:    [x] (04226) Reviewed/Progressed HEP activities related to strengthening, flexibility, endurance, ROM of   [] LE / Lumbar: core, proximal hip and LE for functional self-care, mobility, lifting and ambulation/stair navigation   [] UE / Cervical: cervical, postural, scapular, scapulothoracic and UE control with self care, reaching, carrying, lifting, house/yardwork, driving, computer work  [] (59509)Reviewed/Progressed HEP activities related to improving balance, coordination, kinesthetic sense, posture, motor skill, proprioception of   [] LE: core, proximal hip and LE for self care, mobility, lifting, and ambulation/stair navigation    [] UE / Cervical: cervical, postural,  scapular, scapulothoracic and UE control with self care, reaching, carrying, lifting, house/yardwork, driving, computer work    Manual Treatments:  PROM / STM / Oscillations-Mobs:  G-I, II, III, IV (PA's, Inf., Post.)  [] (92673) Provided manual therapy to mobilize LE, proximal hip and/or LS spine soft tissue/joints for the purpose of modulating pain, promoting relaxation,  increasing ROM, reducing/eliminating soft tissue swelling/inflammation/restriction, improving soft tissue extensibility and allowing for proper ROM for normal function with   [] LE / lumbar: self care, mobility, lifting and ambulation. [] UE / Cervical: self care, reaching, carrying, lifting, house/yardwork, driving, computer work.      Modalities:  [] (91981) Vasopneumatic compression: Utilized vasopneumatic compression to decrease edema / swelling for the purpose of improving mobility and quad tone / recruitment which will allow for increased overall function including but not limited to self-care, transfers, ambulation, and ascending / descending stairs. Charges:  Timed Code Treatment Minutes: 45   Total Treatment Minutes: 45     [] EVAL - LOW (56298)   [] EVAL - MOD (18739)  [] EVAL - HIGH (16198)  [] RE-EVAL (92252)  [x] PN(80150) x   1    [] Ionto  [x] NMR (29988) x 1      [] Vaso  [x] Manual (11629) x 1      [] Ultrasound  [] TA x        [x] Mech Traction (90900)  [] Aquatic Therapy x     [] ES (un) (33478):   [] Home Management Training x  [] ES(attended) (78657)   [] Dry Needling 1-2 muscles (74781):  [] Dry Needling 3+ muscles (764594  [] Group:      [] Other:     GOALS:   GOALS:  Patient stated goal: \"get rid of pain so I can return to exercise\"  [] Progressing: [] Met: [] Not Met: [] Adjusted    Therapist goals for Patient:   Short Term Goals: To be achieved in: 2 weeks  1. Independent in HEP and progression per patient tolerance, in order to prevent re-injury. [] Progressing: [] Met: [] Not Met: [] Adjusted  2. Patient will have a decrease in pain to facilitate improvement in movement, function, and ADLs as indicated by Functional Deficits. [] Progressing: [] Met: [] Not Met: [] Adjusted    Long Term Goals: To be achieved in: 4 weeks  1. Disability index score of 10% or less for the JUANJOSE to assist with reaching prior level of function. [] Progressing: [] Met: [] Not Met: [] Adjusted  2. Patient will demonstrate increased AROM to WNL, good LS mobility, good hip ROM to allow for proper joint functioning as indicated by patients Functional Deficits. [] Progressing: [] Met: [] Not Met: [] Adjusted  3. Patient will demonstrate an increase in Strength to good proximal hip and core activation to allow for proper functional mobility as indicated by patients Functional Deficits. [] Progressing: [] Met: [] Not Met: [] Adjusted  4. Patient will return to functional activities including returning to running and pushups/pull upswithout increased symptoms or restriction.    [] Progressing: [] Met: [] Not Met: [] Adjusted         Overall Progression Towards Functional goals/ Treatment Progress Update:  [x] Patient is progressing as expected towards functional goals listed. [] Progression is slowed due to complexities/Impairments listed. [] Progression has been slowed due to co-morbidities. [] Plan just implemented, too soon to assess goals progression <30days   [] Goals require adjustment due to lack of progress  [] Patient is not progressing as expected and requires additional follow up with physician  [] Other    Persisting Functional Limitations/Impairments:  []Sleeping [x]Sitting               [x]Standing [x]Transfers        [x]Walking []Kneeling               []Stairs [x]Squatting / bending   [x]ADLs []Reaching  [x]Lifting  []Housework  []Driving [x]Job related tasks  [x]Sports/Recreation []Other:        ASSESSMENT:  Improving symptoms; will continue core strengthening as tolerated  Treatment/Activity Tolerance:  [x] Patient able to complete tx [] Patient limited by fatigue  [] Patient limited by pain  [] Patient limited by other medical complications  [] Other:     Prognosis: [x] Good [] Fair  [] Poor    Patient Requires Follow-up: [x] Yes  [] No    Plan for next treatment session:gentle core strengthening within tolerance    PLAN: See luis PT 2x / week for 4 weeks. [x] Continue per plan of care [] Alter current plan (see comments)  [] Plan of care initiated [] Hold pending MD visit [] Discharge    Electronically signed by: Julia Hankins PT, DPT    Note: If patient does not return for scheduled/ recommended follow up visits, this note will serve as a discharge from care along with most recent update on progress.

## 2021-05-17 ENCOUNTER — HOSPITAL ENCOUNTER (OUTPATIENT)
Dept: PHYSICAL THERAPY | Age: 41
Setting detail: THERAPIES SERIES
Discharge: HOME | End: 2021-05-17
Payer: COMMERCIAL

## 2021-05-17 PROCEDURE — 97140 MANUAL THERAPY 1/> REGIONS: CPT

## 2021-05-17 PROCEDURE — 97112 NEUROMUSCULAR REEDUCATION: CPT

## 2021-05-17 PROCEDURE — 97110 THERAPEUTIC EXERCISES: CPT

## 2021-05-17 NOTE — FLOWSHEET NOTE
closed and knees supported. PT with patient to monitor the entire time and automatic release button in patient's hand during treatment. No reports of increased pain after treatment, tolerated without complaint. Pt. Education:  -patient educated on diagnosis, prognosis and expectations for rehab  -all patient questions were answered    HEP instruction:  4/30: added nerve flossing for home    Access Code: [email or web address]  URL: [email or web address]  Date: 04/28/2021  Prepared by: Joan Shetty    Exercises  Supine Single Knee to Chest Stretch - 1 x daily - 7 x weekly - 1 sets - 10 reps - 5 hold  Prone Press Up on Elbows - 1 x daily - 7 x weekly - 1 sets - 10 reps - 5-10 hold  Standing Hamstring Stretch on Chair - 1 x daily - 7 x weekly - 1 sets - 5 reps - 30 hold  Also dynamic hamstring stretch in supine (threading) x 20 B at least once a day    Therapeutic Exercise and NMR EXR  [x] (96497) Provided verbal/tactile cueing for activities related to strengthening, flexibility, endurance, ROM for improvements in  [x] LE / Lumbar: LE, proximal hip, and core control with self care, mobility, lifting, ambulation. [] UE / Cervical: cervical, postural, scapular, scapulothoracic and UE control with self care, reaching, carrying, lifting, house/yardwork, driving, computer work.  [] (48840) Provided verbal/tactile cueing for activities related to improving balance, coordination, kinesthetic sense, posture, motor skill, proprioception to assist with   [] LE / lumbar: LE, proximal hip, and core control in self care, mobility, lifting, ambulation and eccentric single leg control.    [] UE / cervical: cervical, scapular, scapulothoracic and UE control with self care, reaching, carrying, lifting, house/yardwork, driving, computer work.   [] (16140) Therapist is in constant attendance of 2 or more patients providing skilled therapy interventions, but not providing any significant amount of measurable one-on-one time to either patient, for improvements in  [] LE / lumbar: LE, proximal hip, and core control in self care, mobility, lifting, ambulation and eccentric single leg control. [] UE / cervical: cervical, scapular, scapulothoracic and UE control with self care, reaching, carrying, lifting, house/yardwork, driving, computer work. NMR and Therapeutic Activities:    [x] (80499 or 67691) Provided verbal/tactile cueing for activities related to improving balance, coordination, kinesthetic sense, posture, motor skill, proprioception and motor activation to allow for proper function of   [x] LE: / Lumbar core, proximal hip and LE with self care and ADLs  [] UE / Cervical: cervical, postural, scapular, scapulothoracic and UE control with self care, carrying, lifting, driving, computer work.   [] (79966) Gait Re-education- Provided training and instruction to the patient for proper LE, core and proximal hip recruitment and positioning and eccentric body weight control with ambulation re-education including up and down stairs     Home Management Training / Self Care:  [] (71761) Provided self-care/home management training related to activities of daily living and compensatory training, and/or use of adaptive equipment for improvement with: ADLs and compensatory training, meal preparation, safety procedures and instruction in use of adaptive equipment, including bathing, grooming, dressing, personal hygiene, basic household cleaning and chores.      Home Exercise Program:    [x] (56870) Reviewed/Progressed HEP activities related to strengthening, flexibility, endurance, ROM of   [] LE / Lumbar: core, proximal hip and LE for functional self-care, mobility, lifting and ambulation/stair navigation   [] UE / Cervical: cervical, postural, scapular, scapulothoracic and UE control with self care, reaching, carrying, lifting, house/yardwork, driving, computer work  [] (88382)Reviewed/Progressed HEP activities related to improving balance, coordination, kinesthetic sense, posture, motor skill, proprioception of   [] LE: core, proximal hip and LE for self care, mobility, lifting, and ambulation/stair navigation    [] UE / Cervical: cervical, postural,  scapular, scapulothoracic and UE control with self care, reaching, carrying, lifting, house/yardwork, driving, computer work    Manual Treatments:  PROM / STM / Oscillations-Mobs:  G-I, II, III, IV (PA's, Inf., Post.)  [x] (95148) Provided manual therapy to mobilize LE, proximal hip and/or LS spine soft tissue/joints for the purpose of modulating pain, promoting relaxation,  increasing ROM, reducing/eliminating soft tissue swelling/inflammation/restriction, improving soft tissue extensibility and allowing for proper ROM for normal function with   [x] LE / lumbar: self care, mobility, lifting and ambulation. [] UE / Cervical: self care, reaching, carrying, lifting, house/yardwork, driving, computer work. Modalities:  [] (98247) Vasopneumatic compression: Utilized vasopneumatic compression to decrease edema / swelling for the purpose of improving mobility and quad tone / recruitment which will allow for increased overall function including but not limited to self-care, transfers, ambulation, and ascending / descending stairs.        Charges:  Timed Code Treatment Minutes: 45   Total Treatment Minutes: 45     [] EVAL - LOW (77963)   [] EVAL - MOD (63638)  [] EVAL - HIGH (26082)  [] RE-EVAL (26744)  [x] MK(51079) x   1    [] Ionto  [x] NMR (12205) x 1      [] Vaso  [x] Manual (03321) x 1      [] Ultrasound  [] TA x        [x] Mech Traction (65223)  [] Aquatic Therapy x     [] ES (un) (49592):   [] Home Management Training x  [] ES(attended) (84336)   [] Dry Needling 1-2 muscles (57051):  [] Dry Needling 3+ muscles (771809  [] Group:      [] Other:     GOALS:   GOALS:  Patient stated goal: \"get rid of pain so I can return to exercise\"  [x] Progressing: [] Met: [] Not Met: [] Adjusted    Therapist goals for Patient:   Short Term Goals: To be achieved in: 2 weeks  1. Independent in HEP and progression per patient tolerance, in order to prevent re-injury. [] Progressing: [x] Met: [] Not Met: [] Adjusted  2. Patient will have a decrease in pain to facilitate improvement in movement, function, and ADLs as indicated by Functional Deficits. [] Progressing: [x] Met: [] Not Met: [] Adjusted    Long Term Goals: To be achieved in: 4 weeks  1. Disability index score of 10% or less for the JUANJOSE to assist with reaching prior level of function. [x] Progressing: [] Met: [] Not Met: [] Adjusted  2. Patient will demonstrate increased AROM to WNL, good LS mobility, good hip ROM to allow for proper joint functioning as indicated by patients Functional Deficits. [x] Progressing: [] Met: [] Not Met: [] Adjusted  3. Patient will demonstrate an increase in Strength to good proximal hip and core activation to allow for proper functional mobility as indicated by patients Functional Deficits. [x] Progressing: [] Met: [] Not Met: [] Adjusted  4. Patient will return to functional activities including returning to running and pushups/pull upswithout increased symptoms or restriction. [x] Progressing: [] Met: [] Not Met: [] Adjusted         Overall Progression Towards Functional goals/ Treatment Progress Update:  [x] Patient is progressing as expected towards functional goals listed. [] Progression is slowed due to complexities/Impairments listed. [] Progression has been slowed due to co-morbidities.   [] Plan just implemented, too soon to assess goals progression <30days   [] Goals require adjustment due to lack of progress  [] Patient is not progressing as expected and requires additional follow up with physician  [] Other    Persisting Functional Limitations/Impairments:  []Sleeping [x]Sitting               [x]Standing [x]Transfers        [x]Walking []Kneeling               []Stairs [x]Squatting / bending   [x]ADLs []Reaching  [x]Lifting  []Housework  []Driving [x]Job related tasks  [x]Sports/Recreation []Other:        ASSESSMENT:  Improving symptoms; will continue core strengthening as tolerated; requesting additional visits and recommended patient schedule follow up appointment with ortho  Treatment/Activity Tolerance:  [x] Patient able to complete tx [] Patient limited by fatigue  [] Patient limited by pain  [] Patient limited by other medical complications  [] Other:     Prognosis: [x] Good [] Fair  [] Poor    Patient Requires Follow-up: [x] Yes  [] No    Plan for next treatment session:gentle core strengthening within tolerance    PLAN: Requesting additional 2 x week for 3 weeks   [x] Continue per plan of care [] Alter current plan (see comments)  [] Plan of care initiated [] Hold pending MD visit [] Discharge    Electronically signed by: Ida Drew PT PT, DPT    Note: If patient does not return for scheduled/ recommended follow up visits, this note will serve as a discharge from care along with most recent update on progress.

## 2021-05-26 ENCOUNTER — HOSPITAL ENCOUNTER (OUTPATIENT)
Dept: PHYSICAL THERAPY | Age: 41
Setting detail: THERAPIES SERIES
Discharge: HOME | End: 2021-05-26
Payer: COMMERCIAL

## 2021-05-26 PROCEDURE — 97112 NEUROMUSCULAR REEDUCATION: CPT

## 2021-05-26 PROCEDURE — 97110 THERAPEUTIC EXERCISES: CPT

## 2021-05-26 NOTE — FLOWSHEET NOTE
management training related to activities of daily living and compensatory training, and/or use of adaptive equipment for improvement with: ADLs and compensatory training, meal preparation, safety procedures and instruction in use of adaptive equipment, including bathing, grooming, dressing, personal hygiene, basic household cleaning and chores. Home Exercise Program:    [x] (73301) Reviewed/Progressed HEP activities related to strengthening, flexibility, endurance, ROM of   [] LE / Lumbar: core, proximal hip and LE for functional self-care, mobility, lifting and ambulation/stair navigation   [] UE / Cervical: cervical, postural, scapular, scapulothoracic and UE control with self care, reaching, carrying, lifting, house/yardwork, driving, computer work  [] (42674)Reviewed/Progressed HEP activities related to improving balance, coordination, kinesthetic sense, posture, motor skill, proprioception of   [] LE: core, proximal hip and LE for self care, mobility, lifting, and ambulation/stair navigation    [] UE / Cervical: cervical, postural,  scapular, scapulothoracic and UE control with self care, reaching, carrying, lifting, house/yardwork, driving, computer work    Manual Treatments:  PROM / STM / Oscillations-Mobs:  G-I, II, III, IV (PA's, Inf., Post.)  [x] (46847) Provided manual therapy to mobilize LE, proximal hip and/or LS spine soft tissue/joints for the purpose of modulating pain, promoting relaxation,  increasing ROM, reducing/eliminating soft tissue swelling/inflammation/restriction, improving soft tissue extensibility and allowing for proper ROM for normal function with   [x] LE / lumbar: self care, mobility, lifting and ambulation. [] UE / Cervical: self care, reaching, carrying, lifting, house/yardwork, driving, computer work.      Modalities:  [] (70874) Vasopneumatic compression: Utilized vasopneumatic compression to decrease edema / swelling for the purpose of improving mobility and quad tone / recruitment which will allow for increased overall function including but not limited to self-care, transfers, ambulation, and ascending / descending stairs. Charges:  Timed Code Treatment Minutes: 43   Total Treatment Minutes: 43     [] EVAL - LOW (81346)   [] EVAL - MOD (95605)  [] EVAL - HIGH (13155)  [] RE-EVAL (20394)  [x] HC(85232) x   2    [] Ionto  [x] NMR (72560) x 1      [] Vaso  [] Manual (08195) x       [] Ultrasound  [] TA x        [x] Mech Traction (88187)  [] Aquatic Therapy x     [] ES (un) (50952):   [] Home Management Training x  [] ES(attended) (24903)   [] Dry Needling 1-2 muscles (72610):  [] Dry Needling 3+ muscles (518932  [] Group:      [] Other:     GOALS:   GOALS:  Patient stated goal: \"get rid of pain so I can return to exercise\"  [x] Progressing: [] Met: [] Not Met: [] Adjusted    Therapist goals for Patient:   Short Term Goals: To be achieved in: 2 weeks  1. Independent in HEP and progression per patient tolerance, in order to prevent re-injury. [] Progressing: [x] Met: [] Not Met: [] Adjusted  2. Patient will have a decrease in pain to facilitate improvement in movement, function, and ADLs as indicated by Functional Deficits. [] Progressing: [x] Met: [] Not Met: [] Adjusted    Long Term Goals: To be achieved in: 4 weeks  1. Disability index score of 10% or less for the JUANJOSE to assist with reaching prior level of function. [x] Progressing: [] Met: [] Not Met: [] Adjusted  2. Patient will demonstrate increased AROM to WNL, good LS mobility, good hip ROM to allow for proper joint functioning as indicated by patients Functional Deficits. [x] Progressing: [] Met: [] Not Met: [] Adjusted  3. Patient will demonstrate an increase in Strength to good proximal hip and core activation to allow for proper functional mobility as indicated by patients Functional Deficits. [x] Progressing: [] Met: [] Not Met: [] Adjusted  4.  Patient will return to functional activities including returning to running and pushups/pull upswithout increased symptoms or restriction. [x] Progressing: [] Met: [] Not Met: [] Adjusted         Overall Progression Towards Functional goals/ Treatment Progress Update:  [x] Patient is progressing as expected towards functional goals listed. [] Progression is slowed due to complexities/Impairments listed. [] Progression has been slowed due to co-morbidities. [] Plan just implemented, too soon to assess goals progression <30days   [] Goals require adjustment due to lack of progress  [] Patient is not progressing as expected and requires additional follow up with physician  [] Other    Persisting Functional Limitations/Impairments:  []Sleeping [x]Sitting               [x]Standing [x]Transfers        [x]Walking []Kneeling               []Stairs [x]Squatting / bending   [x]ADLs []Reaching  [x]Lifting  []Housework  []Driving [x]Job related tasks  [x]Sports/Recreation []Other:        ASSESSMENT:  Improving symptoms; will continue core strengthening as tolerated; requesting additional visits and recommended patient schedule follow up appointment with ortho  Treatment/Activity Tolerance:  [x] Patient able to complete tx [] Patient limited by fatigue  [] Patient limited by pain  [] Patient limited by other medical complications  [] Other:     Prognosis: [x] Good [] Fair  [] Poor    Patient Requires Follow-up: [x] Yes  [] No    Plan for next treatment session:gentle core strengthening within tolerance    PLAN: Requesting additional 2 x week for 3 weeks   [x] Continue per plan of care [] Alter current plan (see comments)  [] Plan of care initiated [] Hold pending MD visit [] Discharge    Electronically signed by: Shannon Wild, PT PT, DPT    Note: If patient does not return for scheduled/ recommended follow up visits, this note will serve as a discharge from care along with most recent update on progress.

## 2021-05-28 ENCOUNTER — HOSPITAL ENCOUNTER (OUTPATIENT)
Dept: PHYSICAL THERAPY | Age: 41
Setting detail: THERAPIES SERIES
Discharge: HOME | End: 2021-05-28
Payer: COMMERCIAL

## 2021-05-28 PROCEDURE — 97112 NEUROMUSCULAR REEDUCATION: CPT

## 2021-05-28 PROCEDURE — 97110 THERAPEUTIC EXERCISES: CPT

## 2021-05-28 NOTE — FLOWSHEET NOTE
5# weighted ball  X 10     Plank to prone on elbows    Plank with opp arm taps  Side plank on elbows  10 sec/10 sec    10 sec X 5    2 x 5 B  X 3 B    Therapeutic Activities (51724)       Groin stretch in deep squat with reach     Advised in symmetry of WB both in motion and static stand/sit       Piriformis str                   Neuromuscular Re-ed (29721)       BOSU B stance ball OH and rot  5# X 10 eachB    BOSU lunges with ball OH  5# X 10 B                                Manual Intervention (41020)       Long leg distraction left hip       PA glides grade 2 and 3 lumbar spine, focus on lower lumbar region                                       Modalities: 5/5: mechanical lumbar traction 120#/80# at 30 sec/10 sec respectively x 15 min in supine position with table closed and knees supported. PT with patient to monitor the entire time and automatic release button in patient's hand during treatment. No reports of increased pain after treatment, tolerated without complaint. Pt. Education:  -patient educated on diagnosis, prognosis and expectations for rehab  -all patient questions were answered    HEP instruction:  4/30: added nerve flossing for home    Access Code: [email or web address]  URL: [email or web address]  Date: 04/28/2021  Prepared by: Edmond Persaud    Exercises  Supine Single Knee to Chest Stretch - 1 x daily - 7 x weekly - 1 sets - 10 reps - 5 hold  Prone Press Up on Elbows - 1 x daily - 7 x weekly - 1 sets - 10 reps - 5-10 hold  Standing Hamstring Stretch on Chair - 1 x daily - 7 x weekly - 1 sets - 5 reps - 30 hold  Also dynamic hamstring stretch in supine (threading) x 20 B at least once a day    Therapeutic Exercise and NMR EXR  [x] (37946) Provided verbal/tactile cueing for activities related to strengthening, flexibility, endurance, ROM for improvements in  [x] LE / Lumbar: LE, proximal hip, and core control with self care, mobility, lifting, ambulation.   [] UE / Cervical: cervical, postural, scapular, scapulothoracic and UE control with self care, reaching, carrying, lifting, house/yardwork, driving, computer work.  [] (55461) Provided verbal/tactile cueing for activities related to improving balance, coordination, kinesthetic sense, posture, motor skill, proprioception to assist with   [] LE / lumbar: LE, proximal hip, and core control in self care, mobility, lifting, ambulation and eccentric single leg control. [] UE / cervical: cervical, scapular, scapulothoracic and UE control with self care, reaching, carrying, lifting, house/yardwork, driving, computer work.   [] (33330) Therapist is in constant attendance of 2 or more patients providing skilled therapy interventions, but not providing any significant amount of measurable one-on-one time to either patient, for improvements in  [] LE / lumbar: LE, proximal hip, and core control in self care, mobility, lifting, ambulation and eccentric single leg control. [] UE / cervical: cervical, scapular, scapulothoracic and UE control with self care, reaching, carrying, lifting, house/yardwork, driving, computer work.      NMR and Therapeutic Activities:    [x] (23996 or 55005) Provided verbal/tactile cueing for activities related to improving balance, coordination, kinesthetic sense, posture, motor skill, proprioception and motor activation to allow for proper function of   [x] LE: / Lumbar core, proximal hip and LE with self care and ADLs  [] UE / Cervical: cervical, postural, scapular, scapulothoracic and UE control with self care, carrying, lifting, driving, computer work.   [] (45654) Gait Re-education- Provided training and instruction to the patient for proper LE, core and proximal hip recruitment and positioning and eccentric body weight control with ambulation re-education including up and down stairs     Home Management Training / Self Care:  [] (35237) Provided self-care/home management training related to activities of daily living and including but not limited to self-care, transfers, ambulation, and ascending / descending stairs. Charges:  Timed Code Treatment Minutes: 35   Total Treatment Minutes: 35     [] EVAL - LOW (58745)   [] EVAL - MOD (66460)  [] EVAL - HIGH (28840)  [] RE-EVAL (42206)  [x] NS(88086) x   1    [] Ionto  [x] NMR (87961) x 1      [] Vaso  [] Manual (99143) x       [] Ultrasound  [] TA x        [x] Mech Traction (87757)  [] Aquatic Therapy x     [] ES (un) (63847):   [] Home Management Training x  [] ES(attended) (41401)   [] Dry Needling 1-2 muscles (37372):  [] Dry Needling 3+ muscles (532384  [] Group:      [] Other:     GOALS:   GOALS:  Patient stated goal: \"get rid of pain so I can return to exercise\"  [x] Progressing: [] Met: [] Not Met: [] Adjusted    Therapist goals for Patient:   Short Term Goals: To be achieved in: 2 weeks  1. Independent in HEP and progression per patient tolerance, in order to prevent re-injury. [] Progressing: [x] Met: [] Not Met: [] Adjusted  2. Patient will have a decrease in pain to facilitate improvement in movement, function, and ADLs as indicated by Functional Deficits. [] Progressing: [x] Met: [] Not Met: [] Adjusted    Long Term Goals: To be achieved in: 4 weeks  1. Disability index score of 10% or less for the JUANJOSE to assist with reaching prior level of function. [x] Progressing: [] Met: [] Not Met: [] Adjusted  2. Patient will demonstrate increased AROM to WNL, good LS mobility, good hip ROM to allow for proper joint functioning as indicated by patients Functional Deficits. [x] Progressing: [] Met: [] Not Met: [] Adjusted  3. Patient will demonstrate an increase in Strength to good proximal hip and core activation to allow for proper functional mobility as indicated by patients Functional Deficits. [x] Progressing: [] Met: [] Not Met: [] Adjusted  4.  Patient will return to functional activities including returning to running and pushups/pull upswithout increased symptoms or restriction. [x] Progressing: [] Met: [] Not Met: [] Adjusted         Overall Progression Towards Functional goals/ Treatment Progress Update:  [x] Patient is progressing as expected towards functional goals listed. [] Progression is slowed due to complexities/Impairments listed. [] Progression has been slowed due to co-morbidities. [] Plan just implemented, too soon to assess goals progression <30days   [] Goals require adjustment due to lack of progress  [] Patient is not progressing as expected and requires additional follow up with physician  [] Other    Persisting Functional Limitations/Impairments:  []Sleeping [x]Sitting               [x]Standing [x]Transfers        [x]Walking []Kneeling               []Stairs [x]Squatting / bending   [x]ADLs []Reaching  [x]Lifting  []Housework  []Driving [x]Job related tasks  [x]Sports/Recreation []Other:        ASSESSMENT:  Improving symptoms; will continue core strengthening as tolerated;   Treatment/Activity Tolerance:  [x] Patient able to complete tx [] Patient limited by fatigue  [] Patient limited by pain  [] Patient limited by other medical complications  [] Other:     Prognosis: [x] Good [] Fair  [] Poor    Patient Requires Follow-up: [x] Yes  [] No    Plan for next treatment session:gentle core strengthening within tolerance    PLAN: Requesting additional 2 x week for 3 weeks   [x] Continue per plan of care [] Alter current plan (see comments)  [] Plan of care initiated [] Hold pending MD visit [] Discharge    Electronically signed by: Shelton Candelaria, PT PT, DPT    Note: If patient does not return for scheduled/ recommended follow up visits, this note will serve as a discharge from care along with most recent update on progress.

## 2021-07-02 ENCOUNTER — OFFICE VISIT (OUTPATIENT)
Dept: INTERNAL MEDICINE CLINIC | Age: 41
End: 2021-07-02
Payer: COMMERCIAL

## 2021-07-02 VITALS
WEIGHT: 210 LBS | OXYGEN SATURATION: 97 % | BODY MASS INDEX: 28.48 KG/M2 | DIASTOLIC BLOOD PRESSURE: 68 MMHG | SYSTOLIC BLOOD PRESSURE: 116 MMHG | HEART RATE: 88 BPM

## 2021-07-02 DIAGNOSIS — Z13.220 SCREENING, LIPID: ICD-10-CM

## 2021-07-02 DIAGNOSIS — M67.431 GANGLION CYST OF WRIST, RIGHT: Primary | ICD-10-CM

## 2021-07-02 DIAGNOSIS — Z11.4 SCREENING FOR HIV (HUMAN IMMUNODEFICIENCY VIRUS): ICD-10-CM

## 2021-07-02 DIAGNOSIS — Z11.59 ENCOUNTER FOR HEPATITIS C SCREENING TEST FOR LOW RISK PATIENT: ICD-10-CM

## 2021-07-02 DIAGNOSIS — Z13.1 SCREENING FOR DIABETES MELLITUS: ICD-10-CM

## 2021-07-02 PROCEDURE — 99213 OFFICE O/P EST LOW 20 MIN: CPT | Performed by: INTERNAL MEDICINE

## 2021-07-02 SDOH — ECONOMIC STABILITY: FOOD INSECURITY: WITHIN THE PAST 12 MONTHS, YOU WORRIED THAT YOUR FOOD WOULD RUN OUT BEFORE YOU GOT MONEY TO BUY MORE.: NEVER TRUE

## 2021-07-02 SDOH — ECONOMIC STABILITY: FOOD INSECURITY: WITHIN THE PAST 12 MONTHS, THE FOOD YOU BOUGHT JUST DIDN'T LAST AND YOU DIDN'T HAVE MONEY TO GET MORE.: NEVER TRUE

## 2021-07-02 ASSESSMENT — PATIENT HEALTH QUESTIONNAIRE - PHQ9
SUM OF ALL RESPONSES TO PHQ QUESTIONS 1-9: 0
SUM OF ALL RESPONSES TO PHQ QUESTIONS 1-9: 0
1. LITTLE INTEREST OR PLEASURE IN DOING THINGS: 0
SUM OF ALL RESPONSES TO PHQ QUESTIONS 1-9: 0
SUM OF ALL RESPONSES TO PHQ9 QUESTIONS 1 & 2: 0
2. FEELING DOWN, DEPRESSED OR HOPELESS: 0

## 2021-07-02 ASSESSMENT — SOCIAL DETERMINANTS OF HEALTH (SDOH): HOW HARD IS IT FOR YOU TO PAY FOR THE VERY BASICS LIKE FOOD, HOUSING, MEDICAL CARE, AND HEATING?: NOT HARD AT ALL

## 2021-07-02 NOTE — PROGRESS NOTES
Josephine Mcgill (:  1980) is a 46 y.o. male,Established patient, here for evaluation of the following chief complaint(s):  Cyst (right wrist)      ASSESSMENT/PLAN:  1. Ganglion cyst of wrist, right  Assessment & Plan:   Refer to Ortho-Hand  Orders:  -     Sergey Greco MD, Hand Surgery (Hand, Wrist, Upper Extremity), [facility]  2. Screening, lipid  -     Lipid Panel; Future  3. Screening for diabetes mellitus  -     GLUCOSE; Future  4. Encounter for hepatitis C screening test for low risk patient  -     Hepatitis C antibody client; Future  5. Screening for HIV (human immunodeficiency virus)  -     HIV Screen; Future      Patient Instructions   Return for fasting blood work   [facility]        Return in about 3 months (around 10/2/2021) for well exam.    SUBJECTIVE/OBJECTIVE:  HPI    Review of Systems    Physical Exam  Musculoskeletal:      Comments: Right dorsal wrist with trilobed cyst                  An electronic signature was used to authenticate this note. --Scott Mccarthy MD     Documentation was done using voice recognition dragon software. Every effort was made to ensure accuracy; however, inadvertent, unintentional computerized transcription errors may be present.

## 2021-08-25 ENCOUNTER — OFFICE VISIT (OUTPATIENT)
Dept: ORTHOPEDIC SURGERY | Age: 41
End: 2021-08-25
Payer: COMMERCIAL

## 2021-08-25 VITALS — HEIGHT: 72 IN | BODY MASS INDEX: 28.04 KG/M2 | WEIGHT: 207 LBS | RESPIRATION RATE: 16 BRPM

## 2021-08-25 DIAGNOSIS — M67.40 GANGLION CYST: ICD-10-CM

## 2021-08-25 DIAGNOSIS — R20.0 HAND NUMBNESS: Primary | ICD-10-CM

## 2021-08-25 PROCEDURE — 99204 OFFICE O/P NEW MOD 45 MIN: CPT | Performed by: PHYSICIAN ASSISTANT

## 2021-08-25 PROCEDURE — 20612 ASPIRATE/INJ GANGLION CYST: CPT | Performed by: PHYSICIAN ASSISTANT

## 2021-08-25 NOTE — PROGRESS NOTES
Mr. Kirsty Luque is a 46 y.o. right handed man  who is seen today in Hand Surgical Consultation at the request of Marnie Baker MD.    He presents today regarding right wrist symptoms which have been present for approximately 1 years. A history of antecedent trauma or injury is Absent. He reports a mass on the  Dorsal wrist with symptoms that include No Symptoms. Wrist symptoms are exacerbated with no activity exacerbates the symptoms. The size of the mass has been increasing with time and change in activity level. Previous treatment has included no prior treatments used. He does not claim relation of his symptoms to his required work activities. He has not undergone any form of testing. He also presents today regarding left sided symptoms which have been present for approximately 1 months. A history of antecedent trauma or injury is Absent. He reports symptoms to include moderate numbness & tingling in the Ulnar Innervated Digits. Neurologic symptoms do Daily awaken him from sleep. He reports mild pain located in the Medial left elbow, with retrograde radiation . Symptoms are worsening over time. I have today reviewed with Kirsty Luque the clinically relevant, past medical history, medications, allergies,  family history, social history, and Review Of Systems & I have documented any details relevant to today's presenting complaints in my history above. Mr. Lisa Breaux self-reported past medical history, medications, allergies,  family history, social history, and Review Of Systems have been scanned into the chart under the \"Media\" tab. Physical Exam:  Mr. Lisa Breaux most recent vitals:  Vitals  Resp: 16  Height: 6' (182.9 cm)  Weight: 207 lb (93.9 kg)    He is well nourished, oriented to person, place & time. He demonstrates appropriate mood and affect as well as normal gait and station.     Skin: Normal in appearance, Normal Color and Normal Texture Bilaterally   Digital range aggressive treatment may be required for his current presenting condition. Based upon our current discussion and a reasonable understating of the options available to him, Mr. Gladys Kraus has selected to proceed with  an attempted aspiration of the right Ganglion Cyst.  I have outlined for him the nature of the injection, and the pre, anna and post injection considerations and the appropriate expectations for this aspiration. I have clearly explained to him that the above outlined treatment plan should not be expected to 'cure' his Ganglion Cyst, but we are rather treating the symptoms with which he presents. We have discussed the high likelihood of Cyst recurrence after aspiration. He has understood that in order to achieve long lasting relief of his symptoms and to prevent future worsening or further damage, that definitive surgical treatment would be required. Mr. Gladys Kraus  voiced an appropriate understanding of our discussion, the options available to him, and of the expectations of his selected  treatment. He did wish to proceed with Right Dorsal Wrist Ganglion Cyst Aspiration. Procedure:  right Dorsal Ganglion Cyst Aspiration [first aspiration]: After full discussion of the nature of this process and outlining a treatment plan with Mr. Gladys Kraus, we discussed the complications, limitations, expectations, alternatives, and risks of attempted aspiration. We specifically discussed the possibility of incomplete decompression of the cyst, potential residual discomfort or other symptoms, and cyst recurrence. He understood this information well and verbally consented to this treatment. The skin of the symptomatic wrist was prepped with Isopropyl Alcohol and under aseptic conditions a local field block of 1% Lidocaine with out epinephrine was instilled. Once the block was established, a 20 gauge needle was advanced into the center of the palpable mass.   Several punctures were made,  As much material as could be withdrawn was retrieved, it was clinically consistent with ganglion fluid. Digital massage allowed  50 percent decompression of the mass. The needle was withdrawn & a dry sterile bandage was applied and the patient tolerated the injection without difficulty. I advised the patient of the expected response, possible reactions and the instructions for care of the hand. I have also discussed with Mr. Monster Joiner the other treatment options available to him for this condition. We have today selected to proceed with treatment by injection with steroid medication. He and I have agreed that if our current course of Injection treatment does not prove to be effective over the short term future, that he will schedule a follow-up appointment to discuss and select an alternate course of therapy including possibly further conservative treatment or surgical treatment. I have had a thorough discussion with Mr. Monster Joiner regarding the treatment options available for his initially presenting left  cubital tunnel syndrome, which is causing him significant symptoms and difficulty. I have outlined for Mr. Monster Joiner the risk, benefits and consequences of the various treatment modalities, including a reasonable expectation for the long term success of each. We have discussed the likelihood that further, more aggressive treatment may be required for his current presenting condition. Based upon our current discussion and a reasonable understating of the options available to him, Mr. Monster Joiner has selected to proceed with a conservative plan of treatment consisting of: attention to elbow positioning and pressure,  activity modification, and the judicious use of over-the-counter anti-inflammatory medications if allowed by his primary care physician. Instructions were given regarding the use of other modalities as appropriate.   I have clearly explained to him that the above outlined treatment plan should not be expected to 'cure' his  cubital tunnel syndrome, but we are rather treating the symptoms with which he presents. He has understood that in order to achieve more durable relief of his symptoms and to prevent future worsening or further damage, that definitive surgical treatment would be required. Mr. Delio Mckenzie  voiced an appropriate understanding of our discussion, the options available to him, and of the expectations of his selected  treatment. I will ask Mr. Delio Mckenzie. to undergo electrodiagnostic studies of the symptomatic left side. I will ask that he schedule a follow-up appointment with me to review the results of this study after it has been completed. I have also discussed with Mr. Delio Mckenzie  the other treatment options available to him  for this condition. We have today selected to proceed with conservative management. He and I have agreed that if our current course of conservative treatment does not prove to be effective over the short term future, that he will schedule a follow-up appointment to discuss and select an alternate course of therapy including possibly injection or surgical treatment. Mr. Delio Mckenzie has been given a full verbal list of instructions and precautions related to his present condition. I have asked him to followup with me in the office at the prescribed time. He is also specifically requested to call or return to the office sooner if his symptoms change or worsen prior to the next scheduled appointment.

## 2023-08-20 SDOH — HEALTH STABILITY: PHYSICAL HEALTH: ON AVERAGE, HOW MANY DAYS PER WEEK DO YOU ENGAGE IN MODERATE TO STRENUOUS EXERCISE (LIKE A BRISK WALK)?: 1 DAY

## 2023-08-20 SDOH — HEALTH STABILITY: PHYSICAL HEALTH: ON AVERAGE, HOW MANY MINUTES DO YOU ENGAGE IN EXERCISE AT THIS LEVEL?: 90 MIN

## 2023-08-23 ENCOUNTER — OFFICE VISIT (OUTPATIENT)
Dept: INTERNAL MEDICINE CLINIC | Age: 43
End: 2023-08-23
Payer: COMMERCIAL

## 2023-08-23 VITALS
WEIGHT: 230.6 LBS | OXYGEN SATURATION: 99 % | HEIGHT: 72 IN | BODY MASS INDEX: 31.23 KG/M2 | SYSTOLIC BLOOD PRESSURE: 106 MMHG | DIASTOLIC BLOOD PRESSURE: 68 MMHG | HEART RATE: 97 BPM

## 2023-08-23 DIAGNOSIS — N52.9 ERECTILE DISORDER: ICD-10-CM

## 2023-08-23 DIAGNOSIS — F43.29 STRESS AND ADJUSTMENT REACTION: ICD-10-CM

## 2023-08-23 DIAGNOSIS — Z00.00 ANNUAL PHYSICAL EXAM: ICD-10-CM

## 2023-08-23 DIAGNOSIS — G44.209 TENSION-TYPE HEADACHE, NOT INTRACTABLE, UNSPECIFIED CHRONICITY PATTERN: ICD-10-CM

## 2023-08-23 DIAGNOSIS — G47.33 OBSTRUCTIVE SLEEP APNEA SYNDROME: ICD-10-CM

## 2023-08-23 DIAGNOSIS — Z00.00 ANNUAL PHYSICAL EXAM: Primary | ICD-10-CM

## 2023-08-23 DIAGNOSIS — F32.1 CURRENT MODERATE EPISODE OF MAJOR DEPRESSIVE DISORDER WITHOUT PRIOR EPISODE (HCC): ICD-10-CM

## 2023-08-23 DIAGNOSIS — U09.9 POST COVID-19 CONDITION, UNSPECIFIED: ICD-10-CM

## 2023-08-23 PROBLEM — R79.89 LOW TESTOSTERONE IN MALE: Status: ACTIVE | Noted: 2023-08-23

## 2023-08-23 PROBLEM — R51 HEADACHE(784.0): Status: ACTIVE | Noted: 2023-08-23

## 2023-08-23 PROBLEM — R51.9 HEADACHE: Status: ACTIVE | Noted: 2023-08-23

## 2023-08-23 PROBLEM — G43.909 MIGRAINES: Status: ACTIVE | Noted: 2023-08-23

## 2023-08-23 LAB
25(OH)D3 SERPL-MCNC: 33.6 NG/ML
ALBUMIN SERPL-MCNC: 4.9 G/DL (ref 3.4–5)
ALBUMIN/GLOB SERPL: 1.9 {RATIO} (ref 1.1–2.2)
ALP SERPL-CCNC: 77 U/L (ref 40–129)
ALT SERPL-CCNC: 46 U/L (ref 10–40)
ANION GAP SERPL CALCULATED.3IONS-SCNC: 15 MMOL/L (ref 3–16)
AST SERPL-CCNC: 23 U/L (ref 15–37)
BILIRUB SERPL-MCNC: 0.4 MG/DL (ref 0–1)
BUN SERPL-MCNC: 17 MG/DL (ref 7–20)
CALCIUM SERPL-MCNC: 10.1 MG/DL (ref 8.3–10.6)
CHLORIDE SERPL-SCNC: 101 MMOL/L (ref 99–110)
CHOLEST SERPL-MCNC: 204 MG/DL (ref 0–199)
CO2 SERPL-SCNC: 26 MMOL/L (ref 21–32)
CREAT SERPL-MCNC: 1.2 MG/DL (ref 0.9–1.3)
DEPRECATED RDW RBC AUTO: 13.4 % (ref 12.4–15.4)
FERRITIN SERPL IA-MCNC: 394.5 NG/ML (ref 30–400)
GFR SERPLBLD CREATININE-BSD FMLA CKD-EPI: >60 ML/MIN/{1.73_M2}
GLUCOSE SERPL-MCNC: 102 MG/DL (ref 70–99)
HCT VFR BLD AUTO: 47.9 % (ref 40.5–52.5)
HDLC SERPL-MCNC: 38 MG/DL (ref 40–60)
HGB BLD-MCNC: 16.2 G/DL (ref 13.5–17.5)
IRON SATN MFR SERPL: 28 % (ref 20–50)
IRON SERPL-MCNC: 94 UG/DL (ref 59–158)
LDLC SERPL CALC-MCNC: 136 MG/DL
MCH RBC QN AUTO: 29.9 PG (ref 26–34)
MCHC RBC AUTO-ENTMCNC: 33.7 G/DL (ref 31–36)
MCV RBC AUTO: 88.8 FL (ref 80–100)
PLATELET # BLD AUTO: 309 K/UL (ref 135–450)
PMV BLD AUTO: 9.1 FL (ref 5–10.5)
POTASSIUM SERPL-SCNC: 4.7 MMOL/L (ref 3.5–5.1)
PROT SERPL-MCNC: 7.5 G/DL (ref 6.4–8.2)
RBC # BLD AUTO: 5.4 M/UL (ref 4.2–5.9)
SODIUM SERPL-SCNC: 142 MMOL/L (ref 136–145)
TIBC SERPL-MCNC: 335 UG/DL (ref 260–445)
TRIGL SERPL-MCNC: 148 MG/DL (ref 0–150)
TSH SERPL DL<=0.005 MIU/L-ACNC: 1.71 UIU/ML (ref 0.27–4.2)
VLDLC SERPL CALC-MCNC: 30 MG/DL
WBC # BLD AUTO: 7.4 K/UL (ref 4–11)

## 2023-08-23 PROCEDURE — 99396 PREV VISIT EST AGE 40-64: CPT | Performed by: INTERNAL MEDICINE

## 2023-08-23 PROCEDURE — 99214 OFFICE O/P EST MOD 30 MIN: CPT | Performed by: INTERNAL MEDICINE

## 2023-08-23 RX ORDER — SILDENAFIL 50 MG/1
50 TABLET, FILM COATED ORAL PRN
COMMUNITY
Start: 2023-03-15

## 2023-08-23 SDOH — ECONOMIC STABILITY: FOOD INSECURITY: WITHIN THE PAST 12 MONTHS, THE FOOD YOU BOUGHT JUST DIDN'T LAST AND YOU DIDN'T HAVE MONEY TO GET MORE.: NEVER TRUE

## 2023-08-23 SDOH — ECONOMIC STABILITY: HOUSING INSECURITY
IN THE LAST 12 MONTHS, WAS THERE A TIME WHEN YOU DID NOT HAVE A STEADY PLACE TO SLEEP OR SLEPT IN A SHELTER (INCLUDING NOW)?: NO

## 2023-08-23 SDOH — ECONOMIC STABILITY: FOOD INSECURITY: WITHIN THE PAST 12 MONTHS, YOU WORRIED THAT YOUR FOOD WOULD RUN OUT BEFORE YOU GOT MONEY TO BUY MORE.: NEVER TRUE

## 2023-08-23 SDOH — ECONOMIC STABILITY: INCOME INSECURITY: HOW HARD IS IT FOR YOU TO PAY FOR THE VERY BASICS LIKE FOOD, HOUSING, MEDICAL CARE, AND HEATING?: NOT HARD AT ALL

## 2023-08-23 ASSESSMENT — COLUMBIA-SUICIDE SEVERITY RATING SCALE - C-SSRS
1. WITHIN THE PAST MONTH, HAVE YOU WISHED YOU WERE DEAD OR WISHED YOU COULD GO TO SLEEP AND NOT WAKE UP?: NO
6. HAVE YOU EVER DONE ANYTHING, STARTED TO DO ANYTHING, OR PREPARED TO DO ANYTHING TO END YOUR LIFE?: NO
2. HAVE YOU ACTUALLY HAD ANY THOUGHTS OF KILLING YOURSELF?: NO

## 2023-08-23 ASSESSMENT — PATIENT HEALTH QUESTIONNAIRE - PHQ9
4. FEELING TIRED OR HAVING LITTLE ENERGY: 3
8. MOVING OR SPEAKING SO SLOWLY THAT OTHER PEOPLE COULD HAVE NOTICED. OR THE OPPOSITE, BEING SO FIGETY OR RESTLESS THAT YOU HAVE BEEN MOVING AROUND A LOT MORE THAN USUAL: 2
7. TROUBLE CONCENTRATING ON THINGS, SUCH AS READING THE NEWSPAPER OR WATCHING TELEVISION: 2
SUM OF ALL RESPONSES TO PHQ9 QUESTIONS 1 & 2: 3
9. THOUGHTS THAT YOU WOULD BE BETTER OFF DEAD, OR OF HURTING YOURSELF: 0
10. IF YOU CHECKED OFF ANY PROBLEMS, HOW DIFFICULT HAVE THESE PROBLEMS MADE IT FOR YOU TO DO YOUR WORK, TAKE CARE OF THINGS AT HOME, OR GET ALONG WITH OTHER PEOPLE: 1
2. FEELING DOWN, DEPRESSED OR HOPELESS: 3
SUM OF ALL RESPONSES TO PHQ QUESTIONS 1-9: 14
1. LITTLE INTEREST OR PLEASURE IN DOING THINGS: 0
6. FEELING BAD ABOUT YOURSELF - OR THAT YOU ARE A FAILURE OR HAVE LET YOURSELF OR YOUR FAMILY DOWN: 1
5. POOR APPETITE OR OVEREATING: 2
3. TROUBLE FALLING OR STAYING ASLEEP: 1

## 2023-08-23 ASSESSMENT — ENCOUNTER SYMPTOMS
BACK PAIN: 0
SORE THROAT: 0
ABDOMINAL PAIN: 0
COLOR CHANGE: 0
COUGH: 0
WHEEZING: 0
CHEST TIGHTNESS: 0
NAUSEA: 0
SHORTNESS OF BREATH: 0
VOMITING: 0
CONSTIPATION: 0
SINUS PRESSURE: 0

## 2023-08-23 NOTE — ASSESSMENT & PLAN NOTE
age-related health maintenance and immunization recommendations reviewed and discussed with patient, he is up-to-date with all his shots, will update flu vaccine in early fall  Labs ordered, healthy diet and regular exercise recommendations made to patient, encouraged continue abstinence from smoking and alcohol, avoid high caffeine intake  PSA recently checked by urology and remains within normal  Family history is significant for premature coronary artery disease in father  Results of depression screen noted, see below
patient does have history of migraines in the past, although headaches kind of similar to previous migraines they are not as intense or severe, no red flag neurological symptoms, headaches mostly responsive to over-the-counter analgesia, at this point appears to be multifactorial given relapse in his sleep apnea symptoms and recent stress causing tension headaches. He will update sleep study to adjust his CPAP, check labs, recommended he also try decongestant with antihistamine as Claritin-D for few days since dizzy spells somewhat consistent with vertigo and there has been recent COVID infection which may be worsening the symptoms. Can continue as needed Tylenol and or NSAIDs for pain management.
patient will continue care and recommendation as per urology, unlikely organic disorder, suspect symptoms related to stress and anxiety
this appears to be significantly triggering majority of his symptoms given age and otherwise generally healthy status however advised patient will update labs to rule out any underlying organic etiology, recommended he get updated ophthalmology exam and maintain healthy diet with active lifestyle.   Results of PHQ-9 reviewed and discussed with patient, depression appears to be secondary to situational stress, discussed pharmaceutical therapy however at this point recommend starting CBT and counseling since symptoms are moderate at this point, will reevaluate in 6 weeks, if he does not feel there has been satisfactory relief of symptoms with counseling then will discuss starting pharmaceutical agent
this appears to be triggered by underlying stress and adjustment reaction, no red flags or alarming symptoms however discussed with patient may eventually need to be started on medications as noted above but will start by CBT and counseling, reevaluate in 6 weeks
update labs, he will reschedule with sleep medicine as he may need updated sleep study to adjust his CPAP settings, encouraged attempts to maintain active lifestyle and attempt weight loss again
Bladder non-tender and non-distended. Urine clear yellow.

## 2023-08-23 NOTE — PROGRESS NOTES
abdominal tenderness. Musculoskeletal:         General: No swelling, deformity or signs of injury. Normal range of motion. Cervical back: Normal range of motion and neck supple. No tenderness. Right lower leg: No edema. Left lower leg: No edema. Skin:     General: Skin is warm and dry. Neurological:      General: No focal deficit present. Mental Status: He is alert and oriented to person, place, and time. Mental status is at baseline. Cranial Nerves: No cranial nerve deficit. Gait: Gait normal.   Psychiatric:         Mood and Affect: Mood normal.         Behavior: Behavior normal.         Thought Content: Thought content normal.         Electronically signed by Nata Good MD on 8/23/2023 at 8:59 AM.    This dictation was generated by voice recognition computer software. Although all attempts are made to edit the dictation for accuracy, there may be errors in the transcription that are not intended.

## 2023-08-24 LAB
EST. AVERAGE GLUCOSE BLD GHB EST-MCNC: 99.7 MG/DL
HBA1C MFR BLD: 5.1 %

## 2023-09-22 ENCOUNTER — OFFICE VISIT (OUTPATIENT)
Dept: PULMONOLOGY | Age: 43
End: 2023-09-22

## 2023-09-22 VITALS
OXYGEN SATURATION: 98 % | HEIGHT: 71 IN | DIASTOLIC BLOOD PRESSURE: 78 MMHG | WEIGHT: 236 LBS | HEART RATE: 85 BPM | SYSTOLIC BLOOD PRESSURE: 122 MMHG | BODY MASS INDEX: 33.04 KG/M2

## 2023-09-22 DIAGNOSIS — G47.10 HYPERSOMNIA: Primary | ICD-10-CM

## 2023-09-22 DIAGNOSIS — G47.33 OSA ON CPAP: ICD-10-CM

## 2023-09-22 DIAGNOSIS — Z99.89 OSA ON CPAP: ICD-10-CM

## 2023-09-22 PROBLEM — Z00.00 ANNUAL PHYSICAL EXAM: Status: RESOLVED | Noted: 2023-08-23 | Resolved: 2023-09-22

## 2023-09-22 RX ORDER — MODAFINIL 200 MG/1
200 TABLET ORAL DAILY
Qty: 30 TABLET | Refills: 0 | Status: SHIPPED | OUTPATIENT
Start: 2023-09-22 | End: 2023-10-22

## 2023-09-22 ASSESSMENT — SLEEP AND FATIGUE QUESTIONNAIRES
HOW LIKELY ARE YOU TO NOD OFF OR FALL ASLEEP WHEN YOU ARE A PASSENGER IN A CAR FOR AN HOUR WITHOUT A BREAK: 0
HOW LIKELY ARE YOU TO NOD OFF OR FALL ASLEEP WHILE LYING DOWN TO REST IN THE AFTERNOON WHEN CIRCUMSTANCES PERMIT: 0
HOW LIKELY ARE YOU TO NOD OFF OR FALL ASLEEP WHILE WATCHING TV: 1
HOW LIKELY ARE YOU TO NOD OFF OR FALL ASLEEP WHILE SITTING QUIETLY AFTER LUNCH WITHOUT ALCOHOL: 0
NECK CIRCUMFERENCE (INCHES): 17
HOW LIKELY ARE YOU TO NOD OFF OR FALL ASLEEP IN A CAR, WHILE STOPPED FOR A FEW MINUTES IN TRAFFIC: 0
HOW LIKELY ARE YOU TO NOD OFF OR FALL ASLEEP WHILE SITTING AND READING: 1
ESS TOTAL SCORE: 2
HOW LIKELY ARE YOU TO NOD OFF OR FALL ASLEEP WHILE SITTING AND TALKING TO SOMEONE: 0
HOW LIKELY ARE YOU TO NOD OFF OR FALL ASLEEP WHILE SITTING INACTIVE IN A PUBLIC PLACE: 0

## 2023-09-22 NOTE — PROGRESS NOTES
[facility]  Sleep Medicine  [address], [address] [place], [facility]    Chief Complaint   Patient presents with    Sleep Apnea     Sleep study done about 5 years ago, when he doesn't use cpap it gives him a brain fog, has been using machine  ,        Leander Prado is a 41 y.o. male who comes in for evaluation of previously diagnosed ANIBAL. He was diagnosed in 2016. He has been on PAP therapy since then. Patient had lost 60 lbs and therefore stopped using his CPAP. But he regained the weight years later. Patient reports that he had an incident 1.5 month ago when he woke up with apneic episode, which led him to be more consistent with PAP therapy. His current machine however becoming dysfunctional because at times it stops working in the middle of the night. Pertinent PMHx includes: ANIBAL. He was diagnosed with severe obstructive sleep apnea and is being treated with PAP therapy. He has been on PAP therapy on and off for a few years. He states he now wears the PAP device every night. He goes to bed at around 11-midnight. He falls asleep in 10 minutes. He awakens a couple of times per night and takes no naps. Symptoms of sleep apnea have improved since using PAP therapy. He continues to have complaints of  brain fog (though improved with PAP therapy) and residual daytime sleepiness . He is not snoring with the machine on. He denies any complaints of too high pressure, hunger for air, dry mouth, mask fit / discomfort issues, low air humidity, high air humidity, temperature issues, and high/frequent leaks. DME:  none a this time  Mask: full face mask  Machine: Dreamstation auto CPAP    Caffeinated drinks/day: several cups of coffee and soda  Alcohol drinks/day/week: 1 glass of wine a week  Occupation: software      DATA REVIEWED TODAY:    Diagnostic Review  HST on 5/9/2023 showed respiratory event index of 60.8/h with oxygen desaturation down to a dionne of 83%.      [place] & Insomnia

## 2023-09-22 NOTE — PATIENT INSTRUCTIONS
ANIBAL education:    You have obstructive sleep apnea (ANIBAL):    1. Obstructive sleep apnea (ANIBAL) is a condition where the upper airway narrows or closes intermittently during sleep. This can lead to drops in your oxygen levels during sleep, arousals from sleep, and excessive daytime sleepiness. 2. Untreated ANIBAL is associated with increased risk of hypertension, cardiac disease, myocardial infarction, stroke, and poor blood sugar control. Treating your ANIBAL can decrease these risks. 3. Weight loss does improve sleep apnea. It is important to have a healthy diet and an exercise program.     4. Alcohol and sedating medicine can make ANIBAL worse and should be avoided in particular before sleep. 5. If you have surgery or are hospitalized, tell your doctor that you have ANIBAL and bring your CPAP to the hospital.    6. If you are drowsy or sleepy, you should not drive. If you are driving and become drowsy or sleepy, you should pull over to a safe place. Below are our drowsy driving tips. PAP machine care: You should clean your PAP regularly (see your PAP  site for more details)  Daily cleaning   1. Wipe mask with a damp towel with mild detergent, rinse with a damp towel and let air dry. You can also see CPAP cleaning wipes. Avoid harsh cleaning wipes or chemicals. 2. Humidifier- empty water daily. Fill with clean distilled water before use before sleep. Weekly Cleaning   1. Clean mask, headgear, and tubing weekly  2. Fill your sink with warm water and a few drops of mild dish soap. Swirl equipment for at least 5 minutes. Rinse well and air dry. Hang hose over something so the water droplets drip out. 3. Clean filter- rinse with warm water, squeeze excess water and blot dry with towel. If there is a white filter (respironics machine)- do not wash that - it is disposable and should be changed once a month.    4. Humidifier- Disinfect in solution that is one part vinegar and 5 parts water for 30

## 2023-09-22 NOTE — PROGRESS NOTES
Diagnosis: [x] ANIBAL  (G47.33) [] CSA (G47.31) []  Other:__________________   Length of Need: [] 13 months [x]  99 Months                                          []  Other:__________________   Machine (MARYLU): [] Respironics Auto (with modem for remote monitoring)       [] Other:____________________    [x]  ResMed Auto (with modem for remote monitoring)    [x]  CPAP () [] Bilevel ()   Mode: Mode:   [x] Auto [] Fixed [] Auto [] Spontaneous   Pmin:_____8____cmH2O      Pmax:_____16____cmH2O   P:_________cmH2O    EPAPmin:__________cmH2O IPAP:__________cmH2O     IPAPmax:__________cmH2O EPAP:__________cmH2O     PSmin:_______  PSmax:_______       (ResMed) PS:_________     Flex/EPR - 3 full time                          Ramp time: 20 min Flex/EPR - 3 full time                 Ramp time: 30 min   Ramp Pressure:_____6______cmH2O Ramp Pressure:____________cmH2O         Humidifier: [x] Heated ()                                               [] Passive     [x] Water chamber replacement ()/ 1 per 6 months   Mask:   [x] Nasal () /1 per 3 months [x] Full Face () /1 per 3 months   [x] Patient choice -Size and fit mask [x] Patient Choice - Size and fit mask   [] Dispense: [] Dispense:   [x] Headgear () / 1 per 3 months [x] Headgear () / 1 per 3 months   [x] Replacement Nasal Cushion ()/2 per month [x] Interface Replacement ()/1 per month   [x] Replacement Nasal Pillows ()/2 per month       Tubing: [x] Heated ()/1 per 3 months                           [] Standard ()/1 per 3 months  [] Other:____________________   Filters: [x] Non-disposable ()/1 per 6 months                 [x] Ultra-Fine, Disposable ()/2 per month     Miscellaneous: [] Chin Strap ()/ 1 per 6months      [] Other:__________________________________         Start Order Date: 09/22/23    MEDICAL JUSTIFICATION:  I, the undersigned, certify that the above prescribed supplies are medically

## 2023-09-27 ENCOUNTER — OFFICE VISIT (OUTPATIENT)
Dept: PSYCHOLOGY | Age: 43
End: 2023-09-27

## 2023-09-27 DIAGNOSIS — F43.23 ADJUSTMENT DISORDER WITH MIXED ANXIETY AND DEPRESSED MOOD: Primary | ICD-10-CM

## 2023-09-27 ASSESSMENT — PATIENT HEALTH QUESTIONNAIRE - PHQ9
2. FEELING DOWN, DEPRESSED OR HOPELESS: 1
7. TROUBLE CONCENTRATING ON THINGS, SUCH AS READING THE NEWSPAPER OR WATCHING TELEVISION: 2
SUM OF ALL RESPONSES TO PHQ QUESTIONS 1-9: 12
9. THOUGHTS THAT YOU WOULD BE BETTER OFF DEAD, OR OF HURTING YOURSELF: 0
5. POOR APPETITE OR OVEREATING: 2
4. FEELING TIRED OR HAVING LITTLE ENERGY: 2
1. LITTLE INTEREST OR PLEASURE IN DOING THINGS: 0
6. FEELING BAD ABOUT YOURSELF - OR THAT YOU ARE A FAILURE OR HAVE LET YOURSELF OR YOUR FAMILY DOWN: 1
SUM OF ALL RESPONSES TO PHQ QUESTIONS 1-9: 12
SUM OF ALL RESPONSES TO PHQ9 QUESTIONS 1 & 2: 1
8. MOVING OR SPEAKING SO SLOWLY THAT OTHER PEOPLE COULD HAVE NOTICED. OR THE OPPOSITE, BEING SO FIGETY OR RESTLESS THAT YOU HAVE BEEN MOVING AROUND A LOT MORE THAN USUAL: 1
3. TROUBLE FALLING OR STAYING ASLEEP: 3
10. IF YOU CHECKED OFF ANY PROBLEMS, HOW DIFFICULT HAVE THESE PROBLEMS MADE IT FOR YOU TO DO YOUR WORK, TAKE CARE OF THINGS AT HOME, OR GET ALONG WITH OTHER PEOPLE: 1
SUM OF ALL RESPONSES TO PHQ QUESTIONS 1-9: 12
SUM OF ALL RESPONSES TO PHQ QUESTIONS 1-9: 12

## 2023-09-27 ASSESSMENT — ANXIETY QUESTIONNAIRES
4. TROUBLE RELAXING: 1
7. FEELING AFRAID AS IF SOMETHING AWFUL MIGHT HAPPEN: 0
GAD7 TOTAL SCORE: 4
IF YOU CHECKED OFF ANY PROBLEMS ON THIS QUESTIONNAIRE, HOW DIFFICULT HAVE THESE PROBLEMS MADE IT FOR YOU TO DO YOUR WORK, TAKE CARE OF THINGS AT HOME, OR GET ALONG WITH OTHER PEOPLE: SOMEWHAT DIFFICULT
6. BECOMING EASILY ANNOYED OR IRRITABLE: 0
5. BEING SO RESTLESS THAT IT IS HARD TO SIT STILL: 1
1. FEELING NERVOUS, ANXIOUS, OR ON EDGE: 1
2. NOT BEING ABLE TO STOP OR CONTROL WORRYING: 0
3. WORRYING TOO MUCH ABOUT DIFFERENT THINGS: 1

## 2023-09-27 NOTE — PROGRESS NOTES
Behavioral Health Consultation  Negro Boyle, Ph.D.  Clinical Psychologist  9/27/2023  8:12 AM      Time spent with Patient: 30 minutes  [facility] Reason for Consult:    Chief Complaint   Patient presents with    Anxiety       Pt provided informed consent for the behavioral health program. Discussed with patient model of service to include the limits of confidentiality (i.e. abuse reporting, suicide intervention, etc.) and short-term intervention focused approach. Pt indicated understanding. Feedback given to PCP. S:  Pt seen per PCP re: stress. Reported feeling down, insomnia, fatigue, variable appetite, poor self-worth, poor concentration, psychomotor retardation. Noted anxiety sxs including feeling anxious, worry about different topics, feeling restless. Woke up one morning and suddenly felt drunk for no reason. Continues to experience some residual sxs; perception is off, time slips away from him, only being able to focus one thing at a time. Is now using CPAP consistently, but not feeling back-to-normal. Will have a few hrs where he feels better, but only realizes in hindsight. Sleep is eradict d/t staying up late working. Plays [employer] 2x/wk. Not much other physical activity. Eating is \"all over the place. \" If he's eating something he likes he struggled w moderation. Low T w ED sxs. Rel w wife is \"the best it's ever been. \" They spend a lot of their time together. Pt lives with wife, kids (6, 7 yo boys, and 3yo daughter). Pt works as unemployed, has about 1 mo left. 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 This takes up his nights. EtOH: rare  Nicotine: none in 15 yrs  Recreational Use: none  Caffeine: 2 cups of coffee, 3-4 diet sodas, energy drinks 1-2x/wk ([employer]). Provided psychoeducation on relationship between caffeine and anxiety.      O:  MSE:    Appearance    alert,

## 2023-10-04 ENCOUNTER — OFFICE VISIT (OUTPATIENT)
Dept: INTERNAL MEDICINE CLINIC | Age: 43
End: 2023-10-04
Payer: COMMERCIAL

## 2023-10-04 VITALS
SYSTOLIC BLOOD PRESSURE: 116 MMHG | BODY MASS INDEX: 32.87 KG/M2 | HEART RATE: 89 BPM | DIASTOLIC BLOOD PRESSURE: 78 MMHG | OXYGEN SATURATION: 96 % | WEIGHT: 234.8 LBS | HEIGHT: 71 IN

## 2023-10-04 DIAGNOSIS — G47.10 HYPERSOMNIA: ICD-10-CM

## 2023-10-04 DIAGNOSIS — E78.2 MIXED HYPERLIPIDEMIA: ICD-10-CM

## 2023-10-04 DIAGNOSIS — Z82.49 FAMILY HISTORY OF PREMATURE CAD: ICD-10-CM

## 2023-10-04 DIAGNOSIS — F43.29 STRESS AND ADJUSTMENT REACTION: ICD-10-CM

## 2023-10-04 DIAGNOSIS — F32.1 CURRENT MODERATE EPISODE OF MAJOR DEPRESSIVE DISORDER WITHOUT PRIOR EPISODE (HCC): Primary | ICD-10-CM

## 2023-10-04 DIAGNOSIS — G47.33 OBSTRUCTIVE SLEEP APNEA SYNDROME: ICD-10-CM

## 2023-10-04 PROCEDURE — 99214 OFFICE O/P EST MOD 30 MIN: CPT | Performed by: INTERNAL MEDICINE

## 2023-10-04 ASSESSMENT — ENCOUNTER SYMPTOMS
NAUSEA: 0
BACK PAIN: 0
COLOR CHANGE: 0
CONSTIPATION: 0
WHEEZING: 0
ABDOMINAL PAIN: 0
SORE THROAT: 0
COUGH: 0
CHEST TIGHTNESS: 0
SHORTNESS OF BREATH: 0
VOMITING: 0

## 2023-10-04 NOTE — ASSESSMENT & PLAN NOTE
reports no change in symptoms at all since his last visit, he continues to have the fogginess of thinking, feeling dizzy and frequent headaches. He was started on Provigil by sleep medicine without any improvement so far, I did discuss possibly starting antidepressant medication at this point with him however he does not feel strongly about it, he is not sure that this will help his current symptoms. Advised since only had 1 session of counseling and behavioral therapy and scheduled to have another 1 in 3 weeks we will just wait and see the effect of therapy if enough to help with symptoms.   Encouraged to call the office if he changes his mind about needing to start medications  He continues to be without any red flag symptoms, will reevaluate in 12 weeks or sooner if needed

## 2023-10-04 NOTE — ASSESSMENT & PLAN NOTE
no significant change in symptoms, continues to to be compliant with CPAP, he will continue to follow-up with sleep medicine

## 2023-10-04 NOTE — ASSESSMENT & PLAN NOTE
results of lipid profile reviewed and explained to patient, so far no major coronary artery disease risk factors other than family history (father with first MI in early 45s). Counseled regarding need to adhere to low-fat diet with active lifestyle, continue abstinence from smoking and alcohol, cardio 20 to 30 minutes at least 3 times a week. Will check CT cardiac calcium score given family history of premature coronary artery disease.

## 2023-10-04 NOTE — PROGRESS NOTES
is not in acute distress. Appearance: Normal appearance. He is not toxic-appearing. HENT:      Head: Normocephalic. Cardiovascular:      Rate and Rhythm: Normal rate. Pulmonary:      Effort: Pulmonary effort is normal. No respiratory distress. Musculoskeletal:         General: Normal range of motion. Cervical back: Neck supple. Neurological:      General: No focal deficit present. Mental Status: He is alert. Psychiatric:         Mood and Affect: Mood normal.         Behavior: Behavior normal.         Thought Content: Thought content normal.           Electronically signed by Shayna Hollis MD on 10/4/2023 at 8:29 AM.    This dictation was generated by voice recognition computer software. Although all attempts are made to edit the dictation for accuracy, there may be errors in the transcription that are not intended.

## 2023-10-04 NOTE — ASSESSMENT & PLAN NOTE
has been taking modafinil for a week now without significant improvement in symptoms, plan to complete full months to see if its working or not.   He will follow-up with sleep medicine as recommended

## 2023-10-26 ENCOUNTER — TELEPHONE (OUTPATIENT)
Dept: INTERNAL MEDICINE CLINIC | Age: 43
End: 2023-10-26

## 2023-10-26 ENCOUNTER — OFFICE VISIT (OUTPATIENT)
Dept: INTERNAL MEDICINE CLINIC | Age: 43
End: 2023-10-26
Payer: COMMERCIAL

## 2023-10-26 ENCOUNTER — OFFICE VISIT (OUTPATIENT)
Dept: PSYCHOLOGY | Age: 43
End: 2023-10-26
Payer: COMMERCIAL

## 2023-10-26 VITALS
SYSTOLIC BLOOD PRESSURE: 134 MMHG | HEIGHT: 71 IN | BODY MASS INDEX: 33.04 KG/M2 | DIASTOLIC BLOOD PRESSURE: 82 MMHG | WEIGHT: 236 LBS | OXYGEN SATURATION: 98 % | HEART RATE: 86 BPM | TEMPERATURE: 99.1 F

## 2023-10-26 DIAGNOSIS — J02.0 PHARYNGITIS DUE TO GROUP A BETA HEMOLYTIC STREPTOCOCCI: Primary | ICD-10-CM

## 2023-10-26 DIAGNOSIS — F43.23 ADJUSTMENT DISORDER WITH MIXED ANXIETY AND DEPRESSED MOOD: Primary | ICD-10-CM

## 2023-10-26 PROCEDURE — 90832 PSYTX W PT 30 MINUTES: CPT | Performed by: PSYCHOLOGIST

## 2023-10-26 PROCEDURE — 99213 OFFICE O/P EST LOW 20 MIN: CPT

## 2023-10-26 RX ORDER — PREDNISONE 20 MG/1
40 TABLET ORAL DAILY
Qty: 10 TABLET | Refills: 0 | Status: SHIPPED | OUTPATIENT
Start: 2023-10-26 | End: 2023-10-31

## 2023-10-26 RX ORDER — PENICILLIN V POTASSIUM 500 MG/1
TABLET ORAL
COMMUNITY
Start: 2023-10-21

## 2023-10-26 RX ORDER — LIDOCAINE HYDROCHLORIDE 20 MG/ML
15 SOLUTION OROPHARYNGEAL PRN
Qty: 100 ML | Refills: 1 | Status: SHIPPED | OUTPATIENT
Start: 2023-10-26

## 2023-10-26 RX ORDER — PENICILLIN V POTASSIUM 500 MG/1
500 TABLET ORAL DAILY
Qty: 5 TABLET | Refills: 0 | Status: SHIPPED | OUTPATIENT
Start: 2023-10-26 | End: 2023-10-31

## 2023-10-26 ASSESSMENT — PATIENT HEALTH QUESTIONNAIRE - PHQ9
SUM OF ALL RESPONSES TO PHQ9 QUESTIONS 1 & 2: 1
9. THOUGHTS THAT YOU WOULD BE BETTER OFF DEAD, OR OF HURTING YOURSELF: 0
1. LITTLE INTEREST OR PLEASURE IN DOING THINGS: 0
5. POOR APPETITE OR OVEREATING: 1
SUM OF ALL RESPONSES TO PHQ QUESTIONS 1-9: 7
SUM OF ALL RESPONSES TO PHQ QUESTIONS 1-9: 7
4. FEELING TIRED OR HAVING LITTLE ENERGY: 1
7. TROUBLE CONCENTRATING ON THINGS, SUCH AS READING THE NEWSPAPER OR WATCHING TELEVISION: 1
10. IF YOU CHECKED OFF ANY PROBLEMS, HOW DIFFICULT HAVE THESE PROBLEMS MADE IT FOR YOU TO DO YOUR WORK, TAKE CARE OF THINGS AT HOME, OR GET ALONG WITH OTHER PEOPLE: 1
3. TROUBLE FALLING OR STAYING ASLEEP: 2
SUM OF ALL RESPONSES TO PHQ QUESTIONS 1-9: 7
8. MOVING OR SPEAKING SO SLOWLY THAT OTHER PEOPLE COULD HAVE NOTICED. OR THE OPPOSITE, BEING SO FIGETY OR RESTLESS THAT YOU HAVE BEEN MOVING AROUND A LOT MORE THAN USUAL: 1
2. FEELING DOWN, DEPRESSED OR HOPELESS: 1
SUM OF ALL RESPONSES TO PHQ QUESTIONS 1-9: 7
6. FEELING BAD ABOUT YOURSELF - OR THAT YOU ARE A FAILURE OR HAVE LET YOURSELF OR YOUR FAMILY DOWN: 0

## 2023-10-26 ASSESSMENT — ANXIETY QUESTIONNAIRES
1. FEELING NERVOUS, ANXIOUS, OR ON EDGE: 0
5. BEING SO RESTLESS THAT IT IS HARD TO SIT STILL: 0
4. TROUBLE RELAXING: 0
3. WORRYING TOO MUCH ABOUT DIFFERENT THINGS: 0
IF YOU CHECKED OFF ANY PROBLEMS ON THIS QUESTIONNAIRE, HOW DIFFICULT HAVE THESE PROBLEMS MADE IT FOR YOU TO DO YOUR WORK, TAKE CARE OF THINGS AT HOME, OR GET ALONG WITH OTHER PEOPLE: NOT DIFFICULT AT ALL
7. FEELING AFRAID AS IF SOMETHING AWFUL MIGHT HAPPEN: 0
2. NOT BEING ABLE TO STOP OR CONTROL WORRYING: 0
GAD7 TOTAL SCORE: 1
6. BECOMING EASILY ANNOYED OR IRRITABLE: 1

## 2023-10-26 ASSESSMENT — COLUMBIA-SUICIDE SEVERITY RATING SCALE - C-SSRS
5. HAVE YOU STARTED TO WORK OUT OR WORKED OUT THE DETAILS OF HOW TO KILL YOURSELF? DO YOU INTEND TO CARRY OUT THIS PLAN?: NO
4. HAVE YOU HAD THESE THOUGHTS AND HAD SOME INTENTION OF ACTING ON THEM?: NO
3. HAVE YOU BEEN THINKING ABOUT HOW YOU MIGHT KILL YOURSELF?: NO
7. DID THIS OCCUR IN THE LAST THREE MONTHS: NO

## 2023-10-26 ASSESSMENT — ENCOUNTER SYMPTOMS
COUGH: 0
SORE THROAT: 1
SHORTNESS OF BREATH: 0

## 2023-10-26 NOTE — ASSESSMENT & PLAN NOTE
Increase penicillin V dose to TID. Start prednisone - 40 mg daily for 5 days. Avoid NSAIDS while on prednisone. Can resume once completed. Start viscous lidocaine every 3 hours as needed for pain. Instructed on proper use. Start flonase - 1 spray each nostril daily for 7 days. This should help dry postnasal drip. No evidence of abscess on exam. There is erythema and large amount of clear postnasal drainage, likely contributing to pain. Change toothbrush again once antibiotic is complete. If symptoms fail to resolved with completing treatment, follow-up in office.

## 2023-10-26 NOTE — PROGRESS NOTES
Latrice Suárez (:  1980) is a 41 y.o. male,Established patient, here for evaluation of the following chief complaint(s):  Follow-Up from Hospital (Pt c/o swallowing difficulty x 10days/Antibiotic in progress/PRN Ibuprofen 400mg)    ASSESSMENT/PLAN:  1. Pharyngitis due to group A beta hemolytic Streptococci  Assessment & Plan:  Increase penicillin V dose to TID. Start prednisone - 40 mg daily for 5 days. Avoid NSAIDS while on prednisone. Can resume once completed. Start viscous lidocaine every 3 hours as needed for pain. Instructed on proper use. Start flonase - 1 spray each nostril daily for 7 days. This should help dry postnasal drip. No evidence of abscess on exam. There is erythema and large amount of clear postnasal drainage, likely contributing to pain. Change toothbrush again once antibiotic is complete. If symptoms fail to resolved with completing treatment, follow-up in office. Orders:  -     predniSONE (DELTASONE) 20 MG tablet; Take 2 tablets by mouth daily for 5 days, Disp-10 tablet, R-0Normal  -     penicillin v potassium (VEETID) 500 MG tablet; Take 1 tablet by mouth daily for 5 days, Disp-5 tablet, R-0Already on BID, adding 1 daily dose to be TID for 5 days. Normal  -     lidocaine viscous hcl (XYLOCAINE) 2 % SOLN solution; Take 15 mLs by mouth as needed for Irritation, Disp-100 mL, R-1Normal    Return if symptoms worsen or fail to improve. SUBJECTIVE/OBJECTIVE:  HPI  41y.o. year old male here for an acute visit. Went to urgent care 10/21/23. Tested positive for strep. Was prescribed Penicillin V 500 mg BID for 10 days. Has been taking as prescribed. Has 5 days left. Felt he was improving but feels back to worsening with sore throat. Low appetite. Changed his toothbrush. Hasn't used CPAP.      Current Outpatient Medications   Medication Sig Dispense Refill    penicillin v potassium (VEETID) 500 MG tablet TAKE 1 TABLET BY MOUTH TWICE DAILY FOR 10 DAYS      predniSONE

## 2023-10-26 NOTE — TELEPHONE ENCOUNTER
Nisha Cuenca from [facility] called in regards to Pts lidocaine viscous hcl (XYLOCAINE) 2 % SOLN solution. Trudell Score states theyre needing clarification on if he should swish and then swallow or swish and spit. Trudell Score states they also need to know how often he can do this. Please advise and call Danielle.

## 2023-10-26 NOTE — PROGRESS NOTES
Behavioral Health Consultation  Placido Wise, Ph.D.  Psychologist  10/26/2023  10:10 AM      Time spent with Patient: 23 minutes  This is patient's second  [facility] appointment. Reason for Consult:    Chief Complaint   Patient presents with    Depression    Anxiety       Feedback given to PCP. S:  Pt seen for f/u of stress. Pt reported variable mood and sxs. Wife had 3 days in hospital d/t intractable vomiting, he has severely sore throat for last 10 days; hasn't been able to use CPAP. During this time, he was solo parenting. Very low appetite, thinks he's lost 5 lbs in 10 days. Prior to this, had started exercise wo distraction and found this beneficial.     However, he has noticed his cognitive skills are improving, but still doesn't feel \"normal.\" Explored idea of hypervigilance to sxs. Struggles may w changing tasks, overwhelmed if he attempts to multitask. Previously did mindfulness in the AM, but hasn't done in quite awhile. Discussed resuming. He is a worrier typically, specifically about kid's safety. About 10 yrs ago had a period of temporary blindness for aura migraines, wasn't sleeping d/t fear about 2yo's breathing. Hypervigilant about safety like stairs. Currently in assessment process w 7yo's learning differences. Possible dyslexia, ADHD, something else.      O:  MSE:    Appearance    alert, cooperative  Appetite abnormal: low  Sleep disturbance Yes  Fatigue Yes  Loss of pleasure No  Impulsive behavior No  Speech    spontaneous, normal rate, normal volume, and well articulated  Mood    Anxious  Affect    anxiety  Thought Content    intact and cognitive distortions  Thought Process    linear, goal directed, and coherent  Associations    logical connections  Insight    Fair  Judgment    Intact  Orientation    oriented to person, place, time, and general circumstances  Memory    recent and remote memory intact  Attention/Concentration    impaired  Morbid ideation No  Suicide Assessment    no

## 2023-10-31 ENCOUNTER — OFFICE VISIT (OUTPATIENT)
Dept: INTERNAL MEDICINE CLINIC | Age: 43
End: 2023-10-31
Payer: COMMERCIAL

## 2023-10-31 VITALS
DIASTOLIC BLOOD PRESSURE: 76 MMHG | HEIGHT: 70 IN | TEMPERATURE: 99 F | HEART RATE: 118 BPM | WEIGHT: 237 LBS | SYSTOLIC BLOOD PRESSURE: 108 MMHG | OXYGEN SATURATION: 96 % | BODY MASS INDEX: 33.93 KG/M2

## 2023-10-31 DIAGNOSIS — J02.0 PHARYNGITIS DUE TO STREPTOCOCCUS SPECIES: ICD-10-CM

## 2023-10-31 DIAGNOSIS — J02.0 STREP THROAT: Primary | ICD-10-CM

## 2023-10-31 PROCEDURE — 99213 OFFICE O/P EST LOW 20 MIN: CPT | Performed by: NURSE PRACTITIONER

## 2023-10-31 NOTE — PROGRESS NOTES
MG per tablet;  Take 1 tablet by mouth 2 times daily for 10 days  -     [address], [address] [facility], , Otolaryngology, [facility], APRN - CNP

## 2023-11-09 RX ORDER — AMOXICILLIN AND CLAVULANATE POTASSIUM 875; 125 MG/1; MG/1
1 TABLET, FILM COATED ORAL 2 TIMES DAILY
Qty: 20 TABLET | Refills: 0 | Status: SHIPPED | OUTPATIENT
Start: 2023-11-09 | End: 2023-11-19

## 2023-11-09 ASSESSMENT — ENCOUNTER SYMPTOMS
SORE THROAT: 1
VOICE CHANGE: 0
TROUBLE SWALLOWING: 1

## 2023-11-14 ENCOUNTER — OFFICE VISIT (OUTPATIENT)
Dept: ENT CLINIC | Age: 43
End: 2023-11-14
Payer: COMMERCIAL

## 2023-11-14 VITALS
HEART RATE: 91 BPM | OXYGEN SATURATION: 97 % | SYSTOLIC BLOOD PRESSURE: 116 MMHG | TEMPERATURE: 97.7 F | DIASTOLIC BLOOD PRESSURE: 81 MMHG | BODY MASS INDEX: 34.5 KG/M2 | WEIGHT: 241 LBS | HEIGHT: 70 IN

## 2023-11-14 DIAGNOSIS — J03.90 ACUTE TONSILLITIS, UNSPECIFIED ETIOLOGY: Primary | ICD-10-CM

## 2023-11-14 DIAGNOSIS — J30.9 ALLERGIC RHINITIS, UNSPECIFIED SEASONALITY, UNSPECIFIED TRIGGER: ICD-10-CM

## 2023-11-14 PROCEDURE — 99203 OFFICE O/P NEW LOW 30 MIN: CPT | Performed by: STUDENT IN AN ORGANIZED HEALTH CARE EDUCATION/TRAINING PROGRAM

## 2023-11-14 RX ORDER — LORATADINE 10 MG/1
10 TABLET ORAL DAILY
Qty: 30 TABLET | Refills: 1 | Status: SHIPPED | OUTPATIENT
Start: 2023-11-14

## 2023-11-14 NOTE — PROGRESS NOTES
[facility] SURGERY  NEW PATIENT HISTORY AND PHYSICAL NOTE      Patient Name: Romana Mccray  Medical Record Number:  [id number]  Primary Care Physician:  Nata Good MD    ChiefComplaint     Chief Complaint   Patient presents with    Other     Sore throat for about 4 weeks,        History of Present Illness     Romana Mccray is an 41 y.o. male presenting with sore throat x 4 weeks. Initially placed on PCN. Then was switched to Augmentin 4 days ago - since the switch the symptoms have significantly improved. Also had 1 round of oral steroids. Sore throat was quite severe initially - got progressively better but for a while had some issues swallowing. Currently no throat pain or difficulty swallowing. No hx of head or neck surgery. No hx of recurrent tonsil infections or PTA.     + mild environmental allergies with PND. Does not take anything for this. Has tried intermittent flonase in the past.     Wears nasal pillow CPAP for ANIBAL.      Past Medical History     Past Medical History:   Diagnosis Date    Headache     Obstructive sleep apnea        Past Surgical History     Past Surgical History:   Procedure Laterality Date    WISDOM TOOTH EXTRACTION         Family History     Family History   Problem Relation Age of Onset    COPD Father     Heart Attack Father 37    ADHD Father     Heart Disease Father     No Known Problems Mother     No Known Problems Maternal Grandmother     No Known Problems Maternal Grandfather     Mental Illness Paternal Grandmother         Dementia    Cancer Paternal Grandfather     Seizures Sister     No Known Problems Brother     No Known Problems Brother        Social History     Social History     Tobacco Use    Smoking status: Former     Packs/day: 0.50     Years: 9.00     Additional pack years: 0.00     Total pack years: 4.50     Types: Cigarettes     Start date:      Quit date: 2009     Years since quittin.5    Smokeless tobacco:

## 2024-01-05 ENCOUNTER — OFFICE VISIT (OUTPATIENT)
Dept: PULMONOLOGY | Age: 44
End: 2024-01-05
Payer: COMMERCIAL

## 2024-01-05 VITALS
OXYGEN SATURATION: 98 % | DIASTOLIC BLOOD PRESSURE: 86 MMHG | HEART RATE: 77 BPM | BODY MASS INDEX: 33.46 KG/M2 | WEIGHT: 247 LBS | HEIGHT: 72 IN | SYSTOLIC BLOOD PRESSURE: 126 MMHG

## 2024-01-05 DIAGNOSIS — G47.33 OSA ON CPAP: Primary | ICD-10-CM

## 2024-01-05 PROCEDURE — 99214 OFFICE O/P EST MOD 30 MIN: CPT | Performed by: STUDENT IN AN ORGANIZED HEALTH CARE EDUCATION/TRAINING PROGRAM

## 2024-01-05 ASSESSMENT — SLEEP AND FATIGUE QUESTIONNAIRES
HOW LIKELY ARE YOU TO NOD OFF OR FALL ASLEEP WHILE SITTING AND READING: 0
HOW LIKELY ARE YOU TO NOD OFF OR FALL ASLEEP WHILE SITTING AND TALKING TO SOMEONE: 0
ESS TOTAL SCORE: 4
HOW LIKELY ARE YOU TO NOD OFF OR FALL ASLEEP WHILE SITTING QUIETLY AFTER LUNCH WITHOUT ALCOHOL: 0
HOW LIKELY ARE YOU TO NOD OFF OR FALL ASLEEP WHEN YOU ARE A PASSENGER IN A CAR FOR AN HOUR WITHOUT A BREAK: 0
HOW LIKELY ARE YOU TO NOD OFF OR FALL ASLEEP WHILE SITTING INACTIVE IN A PUBLIC PLACE: 0
HOW LIKELY ARE YOU TO NOD OFF OR FALL ASLEEP WHILE WATCHING TV: 2
HOW LIKELY ARE YOU TO NOD OFF OR FALL ASLEEP IN A CAR, WHILE STOPPED FOR A FEW MINUTES IN TRAFFIC: 0
HOW LIKELY ARE YOU TO NOD OFF OR FALL ASLEEP WHILE LYING DOWN TO REST IN THE AFTERNOON WHEN CIRCUMSTANCES PERMIT: 2

## 2024-01-05 NOTE — PROGRESS NOTES
is also falling asleep in the couch more often. Subconsciously this has made him associated his new device with sore throat, though he understands the study case.  He is to continue using auto CPAP but I have increased the pressure to 10-16 cmH2O. Order for fullface mask will be sent to [facility].  I discussed the importance of regular continued nightly use of the PAP machine.  He should never drive if drowsy and should pull over at a safe place if he becomes drowsy while driving.  He should avoid respiratory suppressants as they could worsen nocturnal hypoxemia and sleep disordered breathing.  Obesity/overweight BMI: Weight loss is associated with improvement in sleep disordered breathing. Bautista Galaviz should exercise regularly and watch his diet.  Return in 3 months (on 4/5/2024) for ANIBAL follow up.  He is to contact me if he has any questions prior to that time.      Dayton Zamora MD   Sleep Medicine    1/5/24    This dictation was generated by voice recognition computer software.  Although all attempts are made to edit the dictation for accuracy, there may be errors in the transcription that are not intended.

## 2024-01-05 NOTE — PROGRESS NOTES
Diagnosis: [x] ANIBAL  (G47.33) [] CSA (G47.31) []  Other:__________________   Length of Need: [] 13 months [x]  99 Months                                          []  Other:__________________   Machine (MARYLU): [] Respironics Auto (with modem for remote monitoring)       [] Other:____________________    []  ResMed Auto (with modem for remote monitoring)    []  CPAP () [] Bilevel ()   Mode: Mode:   [] Auto [] Fixed [] Auto [] Spontaneous   Pmin:_________cmH2O      Pmax:_________cmH2O   P:_________cmH2O    EPAPmin:__________cmH2O IPAP:__________cmH2O     IPAPmax:__________cmH2O EPAP:__________cmH2O     PSmin:_______  PSmax:_______       (ResMed) PS:_________     Flex/EPR - 3 full time                          Ramp time: 30 min Flex/EPR - 3 full time                 Ramp time: 30 min   Ramp Pressure:___________cmH2O Ramp Pressure:____________cmH2O         Humidifier: [] Heated ()                                               [] Passive     [] Water chamber replacement ()/ 1 per 6 months   Mask:   [] Nasal () /1 per 3 months [x] Full Face () /1 per 3 months   [] Patient choice -Size and fit mask [x] Patient Choice - Size and fit mask   [] Dispense:  [] Dispense:    [] Headgear () / 1 per 3 months [x] Headgear () / 1 per 3 months   [] Replacement Nasal Cushion ()/2 per month [x] Interface Replacement ()/1 per month   [] Replacement Nasal Pillows ()/2 per month       Tubing: [] Heated ()/1 per 3 months                           [] Standard ()/1 per 3 months  [] Other:____________________   Filters: [] Non-disposable ()/1 per 6 months                 [] Ultra-Fine, Disposable ()/2 per month     Miscellaneous: [] Chin Strap ()/ 1 per 6months      [] Other:__________________________________         Start Order Date: 01/05/24    MEDICAL JUSTIFICATION:  I, the undersigned, certify that the above prescribed supplies are medically necessary for this

## 2024-12-13 ENCOUNTER — OFFICE VISIT (OUTPATIENT)
Dept: INTERNAL MEDICINE CLINIC | Age: 44
End: 2024-12-13

## 2024-12-13 VITALS
HEIGHT: 72 IN | BODY MASS INDEX: 34.32 KG/M2 | HEART RATE: 66 BPM | DIASTOLIC BLOOD PRESSURE: 74 MMHG | SYSTOLIC BLOOD PRESSURE: 110 MMHG | WEIGHT: 253.4 LBS | OXYGEN SATURATION: 100 %

## 2024-12-13 DIAGNOSIS — G47.33 OSA ON CPAP: ICD-10-CM

## 2024-12-13 DIAGNOSIS — Z00.00 ANNUAL PHYSICAL EXAM: Primary | ICD-10-CM

## 2024-12-13 PROBLEM — Z87.39 HISTORY OF HERNIATED INTERVERTEBRAL DISC: Status: ACTIVE | Noted: 2024-12-13

## 2024-12-13 PROBLEM — M51.26 HNP (HERNIATED NUCLEUS PULPOSUS), LUMBAR: Status: RESOLVED | Noted: 2021-04-26 | Resolved: 2024-12-13

## 2024-12-13 PROBLEM — F32.1 CURRENT MODERATE EPISODE OF MAJOR DEPRESSIVE DISORDER WITHOUT PRIOR EPISODE (HCC): Status: RESOLVED | Noted: 2023-08-23 | Resolved: 2024-12-13

## 2024-12-13 PROBLEM — R51.9 HEADACHE: Status: RESOLVED | Noted: 2023-08-23 | Resolved: 2024-12-13

## 2024-12-13 PROBLEM — J02.0 PHARYNGITIS DUE TO GROUP A BETA HEMOLYTIC STREPTOCOCCI: Status: RESOLVED | Noted: 2023-10-26 | Resolved: 2024-12-13

## 2024-12-13 PROBLEM — F43.29 STRESS AND ADJUSTMENT REACTION: Status: RESOLVED | Noted: 2023-08-23 | Resolved: 2024-12-13

## 2024-12-13 SDOH — ECONOMIC STABILITY: INCOME INSECURITY: HOW HARD IS IT FOR YOU TO PAY FOR THE VERY BASICS LIKE FOOD, HOUSING, MEDICAL CARE, AND HEATING?: NOT HARD AT ALL

## 2024-12-13 SDOH — ECONOMIC STABILITY: FOOD INSECURITY: WITHIN THE PAST 12 MONTHS, THE FOOD YOU BOUGHT JUST DIDN'T LAST AND YOU DIDN'T HAVE MONEY TO GET MORE.: NEVER TRUE

## 2024-12-13 SDOH — ECONOMIC STABILITY: FOOD INSECURITY: WITHIN THE PAST 12 MONTHS, YOU WORRIED THAT YOUR FOOD WOULD RUN OUT BEFORE YOU GOT MONEY TO BUY MORE.: NEVER TRUE

## 2024-12-13 ASSESSMENT — PATIENT HEALTH QUESTIONNAIRE - PHQ9
4. FEELING TIRED OR HAVING LITTLE ENERGY: NOT AT ALL
9. THOUGHTS THAT YOU WOULD BE BETTER OFF DEAD, OR OF HURTING YOURSELF: NOT AT ALL
SUM OF ALL RESPONSES TO PHQ QUESTIONS 1-9: 0
SUM OF ALL RESPONSES TO PHQ QUESTIONS 1-9: 0
10. IF YOU CHECKED OFF ANY PROBLEMS, HOW DIFFICULT HAVE THESE PROBLEMS MADE IT FOR YOU TO DO YOUR WORK, TAKE CARE OF THINGS AT HOME, OR GET ALONG WITH OTHER PEOPLE: NOT DIFFICULT AT ALL
6. FEELING BAD ABOUT YOURSELF - OR THAT YOU ARE A FAILURE OR HAVE LET YOURSELF OR YOUR FAMILY DOWN: NOT AT ALL
5. POOR APPETITE OR OVEREATING: NOT AT ALL
8. MOVING OR SPEAKING SO SLOWLY THAT OTHER PEOPLE COULD HAVE NOTICED. OR THE OPPOSITE, BEING SO FIGETY OR RESTLESS THAT YOU HAVE BEEN MOVING AROUND A LOT MORE THAN USUAL: NOT AT ALL
1. LITTLE INTEREST OR PLEASURE IN DOING THINGS: NOT AT ALL
3. TROUBLE FALLING OR STAYING ASLEEP: NOT AT ALL
SUM OF ALL RESPONSES TO PHQ QUESTIONS 1-9: 0
7. TROUBLE CONCENTRATING ON THINGS, SUCH AS READING THE NEWSPAPER OR WATCHING TELEVISION: NOT AT ALL
SUM OF ALL RESPONSES TO PHQ QUESTIONS 1-9: 0

## 2024-12-13 ASSESSMENT — ENCOUNTER SYMPTOMS
COLOR CHANGE: 0
ABDOMINAL PAIN: 0
SHORTNESS OF BREATH: 0
CHEST TIGHTNESS: 0
BACK PAIN: 0
WHEEZING: 0
VOMITING: 0
COUGH: 0
CONSTIPATION: 0
SORE THROAT: 0
NAUSEA: 0

## 2024-12-13 NOTE — ASSESSMENT & PLAN NOTE
Age-related health maintenance and immunization recommendations reviewed and discussed with patient.  Declined flu vaccine  Labs ordered, healthy diet and regular exercise recommendations made to patient, BMI discussed with patient and encouraged attempting weight loss.  Discussed with patient reducing portion sizes and maintaining healthy low-carb low-fat diet  Continue abstinence from smoking, moderate alcohol consumption and continue abstinence from recreational drug use  Depression screen is negative  Follow-up in 1 year and as needed

## 2024-12-13 NOTE — ASSESSMENT & PLAN NOTE
has not seen sleep medicine in a while but reports compliance with CPAP use, continue same and follow-up with sleep medicine as needed

## 2024-12-13 NOTE — PROGRESS NOTES
scrotal swelling and urgency.   Musculoskeletal:  Negative for arthralgias, back pain, gait problem and joint swelling.   Skin:  Negative for color change.   Allergic/Immunologic: Negative for environmental allergies and immunocompromised state.   Neurological:  Negative for dizziness, syncope, speech difficulty, weakness, light-headedness and headaches.   Hematological:  Does not bruise/bleed easily.   Psychiatric/Behavioral:  Negative for behavioral problems and dysphoric mood.        OBJECTIVE:    /74 (Site: Right Upper Arm, Position: Sitting, Cuff Size: Large Adult)   Pulse 66   Ht 1.829 m (6')   Wt 114.9 kg (253 lb 6.4 oz)   SpO2 100%   BMI 34.37 kg/m²    Physical Exam  Vitals and nursing note reviewed.   Constitutional:       General: He is not in acute distress.     Appearance: Normal appearance. He is obese. He is not toxic-appearing.   HENT:      Head: Normocephalic.      Right Ear: Tympanic membrane and ear canal normal. There is no impacted cerumen.      Left Ear: Tympanic membrane and ear canal normal. There is no impacted cerumen.      Nose: Nose normal.      Mouth/Throat:      Mouth: Mucous membranes are moist.      Pharynx: Oropharynx is clear.   Eyes:      Extraocular Movements: Extraocular movements intact.      Conjunctiva/sclera: Conjunctivae normal.      Pupils: Pupils are equal, round, and reactive to light.   Cardiovascular:      Rate and Rhythm: Normal rate and regular rhythm.      Pulses: Normal pulses.      Heart sounds: Normal heart sounds. No murmur heard.  Pulmonary:      Effort: Pulmonary effort is normal. No respiratory distress.      Breath sounds: Normal breath sounds. No wheezing.   Abdominal:      General: Bowel sounds are normal.      Palpations: Abdomen is soft. There is no mass.      Tenderness: There is no abdominal tenderness. There is no rebound.   Musculoskeletal:         General: No swelling, deformity or signs of injury. Normal range of motion.      Cervical